# Patient Record
Sex: MALE | Race: BLACK OR AFRICAN AMERICAN | Employment: OTHER | URBAN - METROPOLITAN AREA
[De-identification: names, ages, dates, MRNs, and addresses within clinical notes are randomized per-mention and may not be internally consistent; named-entity substitution may affect disease eponyms.]

---

## 2017-07-16 ENCOUNTER — HOSPITAL ENCOUNTER (EMERGENCY)
Facility: HOSPITAL | Age: 82
Discharge: HOME | End: 2017-07-16
Attending: EMERGENCY MEDICINE
Payer: MEDICARE

## 2017-07-16 ENCOUNTER — APPOINTMENT (OUTPATIENT)
Dept: CT IMAGING | Facility: HOSPITAL | Age: 82
End: 2017-07-16
Attending: EMERGENCY MEDICINE
Payer: MEDICARE

## 2017-07-16 VITALS
DIASTOLIC BLOOD PRESSURE: 77 MMHG | RESPIRATION RATE: 15 BRPM | SYSTOLIC BLOOD PRESSURE: 161 MMHG | HEART RATE: 55 BPM | TEMPERATURE: 97 F | OXYGEN SATURATION: 99 %

## 2017-07-16 DIAGNOSIS — R40.4 TRANSIENT ALTERATION OF AWARENESS: Primary | ICD-10-CM

## 2017-07-16 LAB
ANION GAP SERPL CALC-SCNC: 8 MMOL/L (ref 0–18)
BASOPHILS # BLD: 0 K/UL (ref 0–0.2)
BASOPHILS NFR BLD: 1 %
BUN SERPL-MCNC: 20 MG/DL (ref 8–20)
BUN/CREAT SERPL: 10.3 (ref 10–20)
CALCIUM SERPL-MCNC: 9.4 MG/DL (ref 8.5–10.5)
CHLORIDE SERPL-SCNC: 111 MMOL/L (ref 95–110)
CO2 SERPL-SCNC: 23 MMOL/L (ref 22–32)
CREAT SERPL-MCNC: 1.94 MG/DL (ref 0.5–1.5)
EOSINOPHIL # BLD: 0.2 K/UL (ref 0–0.7)
EOSINOPHIL NFR BLD: 5 %
ERYTHROCYTE [DISTWIDTH] IN BLOOD BY AUTOMATED COUNT: 14.9 % (ref 11–15)
GLUCOSE BLDC GLUCOMTR-MCNC: 122 MG/DL (ref 70–99)
GLUCOSE SERPL-MCNC: 128 MG/DL (ref 70–99)
HCT VFR BLD AUTO: 42 % (ref 41–52)
HGB BLD-MCNC: 13.8 G/DL (ref 13.5–17.5)
LYMPHOCYTES # BLD: 0.7 K/UL (ref 1–4)
LYMPHOCYTES NFR BLD: 19 %
MCH RBC QN AUTO: 33.9 PG (ref 27–32)
MCHC RBC AUTO-ENTMCNC: 32.9 G/DL (ref 32–37)
MCV RBC AUTO: 102.9 FL (ref 80–100)
MONOCYTES # BLD: 0.3 K/UL (ref 0–1)
MONOCYTES NFR BLD: 9 %
NEUTROPHILS # BLD AUTO: 2.3 K/UL (ref 1.8–7.7)
NEUTROPHILS NFR BLD: 67 %
OSMOLALITY UR CALC.SUM OF ELEC: 298 MOSM/KG (ref 275–295)
PLATELET # BLD AUTO: 195 K/UL (ref 140–400)
PMV BLD AUTO: 9.5 FL (ref 7.4–10.3)
POTASSIUM SERPL-SCNC: 4.2 MMOL/L (ref 3.3–5.1)
RBC # BLD AUTO: 4.08 M/UL (ref 4.5–5.9)
SODIUM SERPL-SCNC: 142 MMOL/L (ref 136–144)
TROPONIN I SERPL-MCNC: 0.02 NG/ML (ref ?–0.03)
WBC # BLD AUTO: 3.4 K/UL (ref 4–11)

## 2017-07-16 PROCEDURE — 85025 COMPLETE CBC W/AUTO DIFF WBC: CPT | Performed by: EMERGENCY MEDICINE

## 2017-07-16 PROCEDURE — 36415 COLL VENOUS BLD VENIPUNCTURE: CPT

## 2017-07-16 PROCEDURE — 93005 ELECTROCARDIOGRAM TRACING: CPT

## 2017-07-16 PROCEDURE — 70450 CT HEAD/BRAIN W/O DYE: CPT | Performed by: EMERGENCY MEDICINE

## 2017-07-16 PROCEDURE — 93010 ELECTROCARDIOGRAM REPORT: CPT | Performed by: EMERGENCY MEDICINE

## 2017-07-16 PROCEDURE — 82962 GLUCOSE BLOOD TEST: CPT

## 2017-07-16 PROCEDURE — 85025 COMPLETE CBC W/AUTO DIFF WBC: CPT

## 2017-07-16 PROCEDURE — 80048 BASIC METABOLIC PNL TOTAL CA: CPT | Performed by: EMERGENCY MEDICINE

## 2017-07-16 PROCEDURE — 84484 ASSAY OF TROPONIN QUANT: CPT | Performed by: EMERGENCY MEDICINE

## 2017-07-16 PROCEDURE — 99285 EMERGENCY DEPT VISIT HI MDM: CPT

## 2017-07-16 PROCEDURE — 84484 ASSAY OF TROPONIN QUANT: CPT

## 2017-07-16 PROCEDURE — 80048 BASIC METABOLIC PNL TOTAL CA: CPT

## 2017-07-16 RX ORDER — MELATONIN 10 MG
10 CAPSULE ORAL NIGHTLY
COMMUNITY

## 2017-07-16 RX ORDER — DOCUSATE SODIUM 100 MG/1
100 CAPSULE, LIQUID FILLED ORAL 2 TIMES DAILY PRN
COMMUNITY

## 2017-07-16 RX ORDER — TAMSULOSIN HYDROCHLORIDE 0.4 MG/1
0.8 CAPSULE ORAL DAILY
COMMUNITY

## 2017-07-16 RX ORDER — ASPIRIN 325 MG
325 TABLET ORAL DAILY
Status: ON HOLD | COMMUNITY
End: 2018-07-23

## 2017-07-16 RX ORDER — CLOPIDOGREL BISULFATE 75 MG/1
75 TABLET ORAL DAILY
Status: ON HOLD | COMMUNITY
End: 2018-07-23

## 2017-07-16 RX ORDER — ACETAMINOPHEN 160 MG
2000 TABLET,DISINTEGRATING ORAL DAILY
COMMUNITY

## 2017-07-16 RX ORDER — ATORVASTATIN CALCIUM 10 MG/1
10 TABLET, FILM COATED ORAL NIGHTLY
COMMUNITY

## 2017-07-16 RX ORDER — NIFEDIPINE 90 MG/1
90 TABLET, EXTENDED RELEASE ORAL DAILY
COMMUNITY

## 2017-07-16 RX ORDER — MEMANTINE HYDROCHLORIDE 10 MG/1
10 TABLET ORAL 2 TIMES DAILY
Status: ON HOLD | COMMUNITY
End: 2018-07-21

## 2017-07-16 RX ORDER — LOSARTAN POTASSIUM 100 MG/1
100 TABLET ORAL DAILY
COMMUNITY

## 2017-07-16 RX ORDER — DONEPEZIL HYDROCHLORIDE 10 MG/1
10 TABLET, FILM COATED ORAL NIGHTLY
COMMUNITY

## 2017-07-16 NOTE — ED INITIAL ASSESSMENT (HPI)
Sister with pt and was eating breakfast. At approx 1100 She left the room and came back and was leaning forward, drooling and difficult to arouse.  Took about 15 min for him to return to his normal.

## 2017-07-16 NOTE — ED PROVIDER NOTES
Patient Seen in: [facility] AND [facility] Emergency Department    History   Patient presents with:  Altered Mental Status (neurologic)    Stated Complaint: ams    HPI    The patient is an 62-year-old male with a history of dementia and multiple TIAs who was sit complaint: ams  Other systems are as noted in HPI. Constitutional and vital signs reviewed. All other systems reviewed and negative except as noted above. PSFH elements reviewed from today and agreed except as otherwise stated in HPI.     Physical following:     POC Glucose  122 (*)     All other components within normal limits   CBC W/ DIFFERENTIAL - Abnormal; Notable for the following:     WBC 3.4 (*)     RBC 4.08 (*)     .9 (*)     MCH 33.9 (*)     Lymphocyte Absolute 0.7 (*)     All other Family comfortable with discharge plan and understands return if any symptoms recur    Symptoms consistent with either TIA or disorientation from dementia.   Patient already on aspirin and Plavix and PMD will complete workup as an outpatient    Disposition

## 2017-07-16 NOTE — ED NOTES
Discharge instructions given to sister. Sister verbalized understanding of home care and to follow up with pcp. Sister denied further questions or concerns. Pt discharged in stable condition with family.

## 2017-07-31 ENCOUNTER — APPOINTMENT (OUTPATIENT)
Dept: CT IMAGING | Facility: HOSPITAL | Age: 82
End: 2017-07-31
Attending: EMERGENCY MEDICINE
Payer: MEDICARE

## 2017-07-31 ENCOUNTER — HOSPITAL ENCOUNTER (EMERGENCY)
Facility: HOSPITAL | Age: 82
Discharge: HOME | End: 2017-07-31
Attending: EMERGENCY MEDICINE
Payer: MEDICARE

## 2017-07-31 VITALS
WEIGHT: 124 LBS | OXYGEN SATURATION: 100 % | HEART RATE: 63 BPM | SYSTOLIC BLOOD PRESSURE: 182 MMHG | RESPIRATION RATE: 20 BRPM | BODY MASS INDEX: 21.97 KG/M2 | DIASTOLIC BLOOD PRESSURE: 83 MMHG | TEMPERATURE: 98 F | HEIGHT: 63 IN

## 2017-07-31 DIAGNOSIS — S01.81XA FACIAL LACERATION, INITIAL ENCOUNTER: ICD-10-CM

## 2017-07-31 DIAGNOSIS — S00.83XA FACIAL HEMATOMA, INITIAL ENCOUNTER: ICD-10-CM
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
## 2017-07-31 PROCEDURE — 12011 RPR F/E/E/N/L/M 2.5 CM/<: CPT

## 2017-07-31 PROCEDURE — 99284 EMERGENCY DEPT VISIT MOD MDM: CPT

## 2017-07-31 PROCEDURE — 70450 CT HEAD/BRAIN W/O DYE: CPT | Performed by: EMERGENCY MEDICINE

## 2017-07-31 PROCEDURE — 90471 IMMUNIZATION ADMIN: CPT

## 2017-07-31 PROCEDURE — 72125 CT NECK SPINE W/O DYE: CPT | Performed by: EMERGENCY MEDICINE

## 2017-07-31 RX ORDER — ACETAMINOPHEN 500 MG
1000 TABLET ORAL ONCE
Status: COMPLETED | OUTPATIENT
Start: 2017-07-31 | End: 2017-07-31

## 2017-07-31 NOTE — ED NOTES
Pts sister given discharge instructions and follow instructions, questions answered and pts sister verbalized understanding.  Pt discharged via wc

## 2017-07-31 NOTE — ED INITIAL ASSESSMENT (HPI)
Pt family states he slipped in fell this morning right eye laceration no loc, pt is on blood thinners

## 2017-07-31 NOTE — ED PROVIDER NOTES
Patient Seen in: [facility] AND [facility] Emergency Department    History   Patient presents with:  Fall (musculoskeletal, neurologic)    Stated Complaint: fall    HPI    72-year-old male with past medical history of dementia, stroke on Plavix and aspirin prese Review of Systems   Unable to perform ROS: Dementia       Positive for stated complaint: fall  Other systems are as noted in HPI. Constitutional and vital signs reviewed. All other systems reviewed and negative except as noted above.     Paige Landry Abdominal: Soft. Normal appearance. There is no tenderness. There is no rigidity, no rebound, no guarding, no tenderness at McBurney's point and negative Lyon's sign.    Musculoskeletal:        Right shoulder: Normal.        Left shoulder: Normal. Fall was mechanical.  Patient's alert and oriented to baseline without any acute neurologic deficits. He is neurovascularly intact. Tetanus is updated. Dermabond is placed on patient's wound for closure.   Patient's head CT and cervical spine are also un PROCEDURE: CT BRAIN OR HEAD (CPT=70450)  COMPARISON: [facility], CT BRAIN OR HEAD (CPT=70450), 7/16/2017, 12:37. INDICATIONS: Fall with traumatic head injury and right supraorbital laceration.   TECHNIQUE: CT images were obtained without co are pneumatized. The patient appears largely edentulous with calculi minimal dental amalgam.  Dictated by (CST): Kwadwo Geiger MD on 7/31/2017 at 10:31     Approved by (CST): Kwadwo Geiger MD on 7/31/2017 at 10:34          CONCLUSION:  1.  Right periorb PROCEDURE: CT SPINE CERVICAL (CPT=72125)  COMPARISON: None available. INDICATIONS: Fall with traumatic head and neck injuries.   TECHNIQUE: Multidetector CT images of the cervical spine were obtained without the infusion of non-ionic intravenous contrast m Clinical impression as well as any lab results and radiology findings were discussed with the patient. Patient is advised to follow up with PCP for reevaluation. Return precautions were given.  Patient voices understanding and agreement with the treatment p

## 2017-07-31 NOTE — ED NOTES
Pt presents to the er with family member for evaluation s/p fall this am. Pt with a hx of a cva and dementia, family reports that pt is acting at baseline for him. Alert to name, slightly sleepy.  Family member reports that he is like that when he hasnt sle

## 2017-09-01 ENCOUNTER — HOSPITAL ENCOUNTER (EMERGENCY)
Facility: HOSPITAL | Age: 82
Discharge: HOME | End: 2017-09-01
Attending: EMERGENCY MEDICINE
Payer: MEDICARE

## 2017-09-01 VITALS
RESPIRATION RATE: 20 BRPM | HEART RATE: 94 BPM | BODY MASS INDEX: 18.37 KG/M2 | WEIGHT: 124 LBS | HEIGHT: 69 IN | DIASTOLIC BLOOD PRESSURE: 95 MMHG | OXYGEN SATURATION: 98 % | TEMPERATURE: 98 F | SYSTOLIC BLOOD PRESSURE: 161 MMHG

## 2017-09-01 DIAGNOSIS — R55 VASOVAGAL SYNCOPE: Primary | ICD-10-CM

## 2017-09-01 LAB
ANION GAP SERPL CALC-SCNC: 11 MMOL/L (ref 0–18)
BASOPHILS # BLD: 0 K/UL (ref 0–0.2)
BASOPHILS NFR BLD: 1 %
BILIRUB UR QL: NEGATIVE
BUN SERPL-MCNC: 24 MG/DL (ref 8–20)
BUN/CREAT SERPL: 11.3 (ref 10–20)
CALCIUM SERPL-MCNC: 9.5 MG/DL (ref 8.5–10.5)
CHLORIDE SERPL-SCNC: 106 MMOL/L (ref 95–110)
CHOLEST SERPL-MCNC: 168 MG/DL (ref 110–200)
CLARITY UR: CLEAR
CO2 SERPL-SCNC: 22 MMOL/L (ref 22–32)
COLOR UR: YELLOW
CREAT SERPL-MCNC: 2.12 MG/DL (ref 0.5–1.5)
EOSINOPHIL # BLD: 0.1 K/UL (ref 0–0.7)
EOSINOPHIL NFR BLD: 4 %
ERYTHROCYTE [DISTWIDTH] IN BLOOD BY AUTOMATED COUNT: 15 % (ref 11–15)
GLUCOSE BLDC GLUCOMTR-MCNC: 171 MG/DL (ref 70–99)
GLUCOSE SERPL-MCNC: 183 MG/DL (ref 70–99)
GLUCOSE UR-MCNC: NEGATIVE MG/DL
HCT VFR BLD AUTO: 38.7 % (ref 41–52)
HDLC SERPL-MCNC: 66 MG/DL
HGB BLD-MCNC: 13.2 G/DL (ref 13.5–17.5)
HGB UR QL STRIP.AUTO: NEGATIVE
HYALINE CASTS #/AREA URNS AUTO: 4 /LPF
KETONES UR-MCNC: NEGATIVE MG/DL
LDLC SERPL CALC-MCNC: 86 MG/DL (ref 0–99)
LYMPHOCYTES # BLD: 0.6 K/UL (ref 1–4)
LYMPHOCYTES NFR BLD: 17 %
MCH RBC QN AUTO: 34.4 PG (ref 27–32)
MCHC RBC AUTO-ENTMCNC: 34.1 G/DL (ref 32–37)
MCV RBC AUTO: 100.6 FL (ref 80–100)
MONOCYTES # BLD: 0.3 K/UL (ref 0–1)
MONOCYTES NFR BLD: 8 %
NEUTROPHILS # BLD AUTO: 2.7 K/UL (ref 1.8–7.7)
NEUTROPHILS NFR BLD: 72 %
NITRITE UR QL STRIP.AUTO: NEGATIVE
NONHDLC SERPL-MCNC: 102 MG/DL
OSMOLALITY UR CALC.SUM OF ELEC: 297 MOSM/KG (ref 275–295)
PH UR: 5 [PH] (ref 5–8)
PLATELET # BLD AUTO: 229 K/UL (ref 140–400)
PMV BLD AUTO: 9.3 FL (ref 7.4–10.3)
POTASSIUM SERPL-SCNC: 3.6 MMOL/L (ref 3.3–5.1)
PROT UR-MCNC: NEGATIVE MG/DL
RBC # BLD AUTO: 3.85 M/UL (ref 4.5–5.9)
RBC #/AREA URNS AUTO: 2 /HPF
SODIUM SERPL-SCNC: 139 MMOL/L (ref 136–144)
SP GR UR STRIP: 1.01 (ref 1–1.03)
TRIGL SERPL-MCNC: 78 MG/DL (ref 1–149)
TROPONIN I SERPL-MCNC: 0.06 NG/ML (ref ?–0.03)
TROPONIN I SERPL-MCNC: 0.07 NG/ML (ref ?–0.03)
UROBILINOGEN UR STRIP-ACNC: <2
VIT C UR-MCNC: NEGATIVE MG/DL
WBC # BLD AUTO: 3.8 K/UL (ref 4–11)
WBC #/AREA URNS AUTO: 4 /HPF

## 2017-09-01 PROCEDURE — 84484 ASSAY OF TROPONIN QUANT: CPT | Performed by: EMERGENCY MEDICINE

## 2017-09-01 PROCEDURE — 93005 ELECTROCARDIOGRAM TRACING: CPT

## 2017-09-01 PROCEDURE — 96361 HYDRATE IV INFUSION ADD-ON: CPT

## 2017-09-01 PROCEDURE — 81001 URINALYSIS AUTO W/SCOPE: CPT | Performed by: EMERGENCY MEDICINE

## 2017-09-01 PROCEDURE — 93010 ELECTROCARDIOGRAM REPORT: CPT | Performed by: EMERGENCY MEDICINE

## 2017-09-01 PROCEDURE — 85025 COMPLETE CBC W/AUTO DIFF WBC: CPT | Performed by: EMERGENCY MEDICINE

## 2017-09-01 PROCEDURE — 82962 GLUCOSE BLOOD TEST: CPT

## 2017-09-01 PROCEDURE — 99285 EMERGENCY DEPT VISIT HI MDM: CPT

## 2017-09-01 PROCEDURE — 80048 BASIC METABOLIC PNL TOTAL CA: CPT | Performed by: EMERGENCY MEDICINE

## 2017-09-01 PROCEDURE — 96360 HYDRATION IV INFUSION INIT: CPT

## 2017-09-01 PROCEDURE — 80061 LIPID PANEL: CPT | Performed by: EMERGENCY MEDICINE

## 2017-09-01 RX ORDER — SODIUM CHLORIDE 9 MG/ML
INJECTION, SOLUTION INTRAVENOUS ONCE
Status: COMPLETED | OUTPATIENT
Start: 2017-09-01 | End: 2017-09-01

## 2017-09-01 NOTE — ED INITIAL ASSESSMENT (HPI)
Rojas Irving arrives to ED via [facility] for eval of syncopal episode. His wife called 911 when he had syncopal episode on toilet. He has hx of dementia and CVA, which left him with right-side deficit, per wife. He c/o abd pain.

## 2017-09-02 NOTE — CM/SW NOTE
Spoke with wife about home health services. Wife agreed to home health services to help patient. Dr. Sherlyn Prince put in order for face to face. Will leave VM on Bridgets ext about patient.

## 2017-09-02 NOTE — ED PROVIDER NOTES
Patient Seen in: [facility] AND [facility] Emergency Department     History   Patient presents with:  Syncope (cardiovascular, neurologic)    Stated Complaint: syncope    HPI    [de-identified]year old male presents to the ER after an episode of syncope.   Wife reports patient complaint: syncope  Other systems are as noted in HPI. Constitutional and vital signs reviewed. All other systems reviewed and negative except as noted above. PSFH elements reviewed from today and agreed except as otherwise stated in HPI.     Physi BASIC METABOLIC PANEL (8) - Abnormal; Notable for the following:        Result Value    Glucose 183 (*)     BUN 24 (*)     Creatinine 2.12 (*)     Calculated Osmolality 297 (*)     GFR, Non- 30 (*)     GFR, -American 37 (*)     All display    ED Medications Administered:   Medications   0.9%  NaCl infusion (500 mL/hr Intravenous New Bag 9/1/17 9525)         Procedures: None    Re-Evaluation: 10:40 PM, patient remained perfectly stable and asymptomatic while in the ED without any arrh care.  Further Outpatient evaluation and treatment will be required.  I personally discussed the results of the above ED workup and a number of associated acute management issues with the guardian, and I explained the need for further follow-up evaluation a

## 2017-09-02 NOTE — HOME CARE LIAISON
DIAGNOSES AND PERTINENT MEDICAL HISTORY: SYNCOPE    FACILITY NAME AND DC DATE:[facility] SEEN IN ER WITH DC 9/1/17    BEDSIDE VISIT WITH: ALMA DELIA ( SPOKE PER TELEPHONE WITH SISTER Quentin Lazo)    SERVICES ORDERED: RN PT    VERIFIED PATIENT ADDRESS, PHONE NUMBER AND

## 2018-01-29 ENCOUNTER — HOSPITAL ENCOUNTER (EMERGENCY)
Facility: HOSPITAL | Age: 83
Discharge: HOME | End: 2018-01-30
Payer: MEDICARE

## 2018-01-29 ENCOUNTER — APPOINTMENT (OUTPATIENT)
Dept: GENERAL RADIOLOGY | Facility: HOSPITAL | Age: 83
End: 2018-01-29
Payer: MEDICARE

## 2018-01-29 DIAGNOSIS — M10.9 ACUTE GOUT INVOLVING TOE OF LEFT FOOT, UNSPECIFIED CAUSE: Primary | ICD-10-CM

## 2018-01-29 PROCEDURE — 99283 EMERGENCY DEPT VISIT LOW MDM: CPT

## 2018-01-29 PROCEDURE — 73630 X-RAY EXAM OF FOOT: CPT

## 2018-01-29 RX ORDER — METHYLPREDNISOLONE 4 MG/1
TABLET ORAL
Qty: 1 PACKAGE | Refills: 0 | Status: SHIPPED | OUTPATIENT
Start: 2018-01-29 | End: 2018-02-03

## 2018-01-29 RX ORDER — TRAMADOL HYDROCHLORIDE 50 MG/1
50 TABLET ORAL EVERY 6 HOURS PRN
Qty: 20 TABLET | Refills: 0 | Status: SHIPPED | OUTPATIENT
Start: 2018-01-29 | End: 2018-02-05

## 2018-01-30 VITALS
HEART RATE: 72 BPM | BODY MASS INDEX: 22.15 KG/M2 | RESPIRATION RATE: 20 BRPM | DIASTOLIC BLOOD PRESSURE: 81 MMHG | SYSTOLIC BLOOD PRESSURE: 144 MMHG | TEMPERATURE: 99 F | OXYGEN SATURATION: 98 % | WEIGHT: 125 LBS | HEIGHT: 63 IN

## 2018-01-30 NOTE — ED PROVIDER NOTES
Patient Seen in: [facility] AND [facility] Emergency Department    History   Patient presents with:  Swelling Edema (cardiovascular, metabolic)    Stated Complaint:     HPI    The patient is an 61-year-old male who presents with left foot pain since this morning Cardiovascular: Normal rate, regular rhythm, normal heart sounds and intact distal pulses. No murmur heard. Pulmonary/Chest: Effort normal and breath sounds normal.   Abdominal: Soft. Bowel sounds are normal. He exhibits no distension.  There is no tend Shantelle Deutsch [id number]  [phone]    Schedule an appointment as soon as possible for a visit      We recommend that you schedule follow up care with a primary care provider within the next three months to obtain basic health screening including reassessment

## 2018-01-30 NOTE — ED INITIAL ASSESSMENT (HPI)
Lt foot redness and swelling through day, wife states possible injury from dresser falling on foot.  + swelling, + cms, pt poor historian

## 2018-07-20 ENCOUNTER — APPOINTMENT (OUTPATIENT)
Dept: CT IMAGING | Facility: HOSPITAL | Age: 83
DRG: 086 | End: 2018-07-20
Payer: MEDICARE

## 2018-07-20 ENCOUNTER — HOSPITAL ENCOUNTER (INPATIENT)
Facility: HOSPITAL | Age: 83
LOS: 6 days | Discharge: NURSING, REHAB OR LONG-TERM CARE | DRG: 086 | End: 2018-07-26
Admitting: INTERNAL MEDICINE
Payer: MEDICARE

## 2018-07-20 ENCOUNTER — APPOINTMENT (OUTPATIENT)
Dept: CT IMAGING | Facility: HOSPITAL | Age: 83
DRG: 086 | End: 2018-07-20
Attending: EMERGENCY MEDICINE
Payer: MEDICARE

## 2018-07-20 DIAGNOSIS — I61.9 INTRAPARENCHYMAL HEMORRHAGE OF BRAIN (HCC): Primary | ICD-10-CM

## 2018-07-20 LAB
ANION GAP SERPL CALC-SCNC: 6 MMOL/L (ref 0–18)
ANTIBODY SCREEN: NEGATIVE
BASOPHILS # BLD: 0 K/UL (ref 0–0.2)
BASOPHILS NFR BLD: 0 %
BUN SERPL-MCNC: 17 MG/DL (ref 8–20)
BUN/CREAT SERPL: 8.5 (ref 10–20)
CALCIUM SERPL-MCNC: 9.7 MG/DL (ref 8.5–10.5)
CHLORIDE SERPL-SCNC: 109 MMOL/L (ref 95–110)
CO2 SERPL-SCNC: 29 MMOL/L (ref 22–32)
CREAT SERPL-MCNC: 2 MG/DL (ref 0.5–1.5)
EOSINOPHIL # BLD: 0.1 K/UL (ref 0–0.7)
EOSINOPHIL NFR BLD: 3 %
ERYTHROCYTE [DISTWIDTH] IN BLOOD BY AUTOMATED COUNT: 15.1 % (ref 11–15)
GLUCOSE SERPL-MCNC: 96 MG/DL (ref 70–99)
HCT VFR BLD AUTO: 36.5 % (ref 41–52)
HGB BLD-MCNC: 12.2 G/DL (ref 13.5–17.5)
LYMPHOCYTES # BLD: 0.4 K/UL (ref 1–4)
LYMPHOCYTES NFR BLD: 10 %
MCH RBC QN AUTO: 34.3 PG (ref 27–32)
MCHC RBC AUTO-ENTMCNC: 33.5 G/DL (ref 32–37)
MCV RBC AUTO: 102.3 FL (ref 80–100)
MONOCYTES # BLD: 0.3 K/UL (ref 0–1)
MONOCYTES NFR BLD: 8 %
NEUTROPHILS # BLD AUTO: 3 K/UL (ref 1.8–7.7)
NEUTROPHILS NFR BLD: 78 %
OSMOLALITY UR CALC.SUM OF ELEC: 299 MOSM/KG (ref 275–295)
PLATELET # BLD AUTO: 204 K/UL (ref 140–400)
PMV BLD AUTO: 8.5 FL (ref 7.4–10.3)
POTASSIUM SERPL-SCNC: 4.1 MMOL/L (ref 3.3–5.1)
RBC # BLD AUTO: 3.56 M/UL (ref 4.5–5.9)
RH BLOOD TYPE: POSITIVE
SODIUM SERPL-SCNC: 144 MMOL/L (ref 136–144)
WBC # BLD AUTO: 3.9 K/UL (ref 4–11)

## 2018-07-20 PROCEDURE — 86901 BLOOD TYPING SEROLOGIC RH(D): CPT | Performed by: EMERGENCY MEDICINE

## 2018-07-20 PROCEDURE — 96375 TX/PRO/DX INJ NEW DRUG ADDON: CPT

## 2018-07-20 PROCEDURE — 72125 CT NECK SPINE W/O DYE: CPT | Performed by: EMERGENCY MEDICINE

## 2018-07-20 PROCEDURE — 70450 CT HEAD/BRAIN W/O DYE: CPT

## 2018-07-20 PROCEDURE — 93005 ELECTROCARDIOGRAM TRACING: CPT

## 2018-07-20 PROCEDURE — 96361 HYDRATE IV INFUSION ADD-ON: CPT

## 2018-07-20 PROCEDURE — 93010 ELECTROCARDIOGRAM REPORT: CPT | Performed by: EMERGENCY MEDICINE

## 2018-07-20 PROCEDURE — 36430 TRANSFUSION BLD/BLD COMPNT: CPT

## 2018-07-20 PROCEDURE — 86900 BLOOD TYPING SEROLOGIC ABO: CPT | Performed by: EMERGENCY MEDICINE

## 2018-07-20 PROCEDURE — 86850 RBC ANTIBODY SCREEN: CPT | Performed by: EMERGENCY MEDICINE

## 2018-07-20 PROCEDURE — 85025 COMPLETE CBC W/AUTO DIFF WBC: CPT

## 2018-07-20 PROCEDURE — 96374 THER/PROPH/DIAG INJ IV PUSH: CPT

## 2018-07-20 PROCEDURE — 99285 EMERGENCY DEPT VISIT HI MDM: CPT

## 2018-07-20 PROCEDURE — 80048 BASIC METABOLIC PNL TOTAL CA: CPT

## 2018-07-20 RX ORDER — ATORVASTATIN CALCIUM 10 MG/1
10 TABLET, FILM COATED ORAL NIGHTLY
Status: DISCONTINUED | OUTPATIENT
Start: 2018-07-20 | End: 2018-07-26

## 2018-07-20 RX ORDER — ALFUZOSIN HYDROCHLORIDE 10 MG/1
10 TABLET, EXTENDED RELEASE ORAL
Status: DISCONTINUED | OUTPATIENT
Start: 2018-07-21 | End: 2018-07-26

## 2018-07-20 RX ORDER — DOCUSATE SODIUM 100 MG/1
100 CAPSULE, LIQUID FILLED ORAL 2 TIMES DAILY PRN
Status: DISCONTINUED | OUTPATIENT
Start: 2018-07-20 | End: 2018-07-26

## 2018-07-20 RX ORDER — DEXTROSE MONOHYDRATE AND SODIUM CHLORIDE 5; .225 G/100ML; G/100ML
INJECTION, SOLUTION INTRAVENOUS CONTINUOUS
Status: DISCONTINUED | OUTPATIENT
Start: 2018-07-20 | End: 2018-07-21

## 2018-07-20 RX ORDER — SODIUM CHLORIDE 9 MG/ML
INJECTION, SOLUTION INTRAVENOUS
Status: COMPLETED
Start: 2018-07-20 | End: 2018-07-20

## 2018-07-20 RX ORDER — HYDRALAZINE HYDROCHLORIDE 20 MG/ML
10 INJECTION INTRAMUSCULAR; INTRAVENOUS EVERY 4 HOURS PRN
Status: DISCONTINUED | OUTPATIENT
Start: 2018-07-20 | End: 2018-07-26

## 2018-07-20 RX ORDER — DEXTROSE AND SODIUM CHLORIDE 5; .45 G/100ML; G/100ML
INJECTION, SOLUTION INTRAVENOUS ONCE
Status: COMPLETED | OUTPATIENT
Start: 2018-07-20 | End: 2018-07-20

## 2018-07-20 RX ORDER — DONEPEZIL HYDROCHLORIDE 10 MG/1
10 TABLET, FILM COATED ORAL NIGHTLY
Status: DISCONTINUED | OUTPATIENT
Start: 2018-07-20 | End: 2018-07-26

## 2018-07-20 RX ORDER — HYDRALAZINE HYDROCHLORIDE 20 MG/ML
10 INJECTION INTRAMUSCULAR; INTRAVENOUS ONCE
Status: COMPLETED | OUTPATIENT
Start: 2018-07-20 | End: 2018-07-20

## 2018-07-20 RX ORDER — MEMANTINE HYDROCHLORIDE 10 MG/1
10 TABLET ORAL 2 TIMES DAILY
Status: DISCONTINUED | OUTPATIENT
Start: 2018-07-20 | End: 2018-07-26

## 2018-07-20 RX ORDER — LABETALOL HYDROCHLORIDE 5 MG/ML
10 INJECTION, SOLUTION INTRAVENOUS ONCE
Status: COMPLETED | OUTPATIENT
Start: 2018-07-20 | End: 2018-07-20

## 2018-07-20 RX ORDER — LOSARTAN POTASSIUM 100 MG/1
100 TABLET ORAL DAILY
Status: DISCONTINUED | OUTPATIENT
Start: 2018-07-20 | End: 2018-07-26

## 2018-07-20 NOTE — ED PROVIDER NOTES
Patient Seen in: [facility] AND [facility] Emergency Department    History   Patient presents with:  Head Neck Injury (neurologic, musculoskeletal)    Stated Complaint: fall earlier, hit head; on blood thinners     HPI    61-year-old male with history of dementi reactive to light. Neck: Normal range of motion. Neck supple. No posterior midline cervical spine pain on palpation. Cardiovascular: Normal rate, regular rhythm and intact and equal distal pulses.     Pulmonary/Chest: Effort normal. No respiratory distr panel order TYPE AND SCREEN.   Procedure                               Abnormality         Status                     ---------                               -----------         ------                     82 Vanessa Bhatti (BLOOD [facility])[[id number]] Prescribed:  Current Discharge Medication List        Present on Admission           ICD-10-CM Noted POA    Intraparenchymal hemorrhage of brain ([facility] I61.9 7/20/2018 Unknown

## 2018-07-20 NOTE — ED NOTES
Pt presents with sister, states pt fell off the bed today at 1500, hitting head on ceramic floor. Pt with hematoma to left forehead. Per family, pt is acting per norm, denies n/v.  + plavix.

## 2018-07-20 NOTE — ED INITIAL ASSESSMENT (HPI)
Pt fell off the side of his bed today and hit his head onto the ceramic aminah in home,hematoma to left forehead. Pt is on plavix.  Pt denies loc

## 2018-07-21 ENCOUNTER — APPOINTMENT (OUTPATIENT)
Dept: CT IMAGING | Facility: HOSPITAL | Age: 83
DRG: 086 | End: 2018-07-21
Attending: EMERGENCY MEDICINE
Payer: MEDICARE

## 2018-07-21 PROBLEM — E78.5 HYPERLIPIDEMIA: Status: ACTIVE | Noted: 2018-07-21
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
## 2018-07-21 PROBLEM — R26.9 NEUROLOGIC GAIT DISORDER: Status: ACTIVE | Noted: 2018-07-21

## 2018-07-21 PROBLEM — I10 ESSENTIAL HYPERTENSION, BENIGN: Status: ACTIVE | Noted: 2018-07-21

## 2018-07-21 PROBLEM — I12.9 HYPERTENSIVE KIDNEY DISEASE: Status: ACTIVE | Noted: 2018-07-21

## 2018-07-21 PROBLEM — F01.51: Status: ACTIVE | Noted: 2018-07-21
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
## 2018-07-21 LAB
ANION GAP SERPL CALC-SCNC: 6 MMOL/L (ref 0–18)
BASOPHILS # BLD: 0 K/UL (ref 0–0.2)
BASOPHILS NFR BLD: 0 %
BUN SERPL-MCNC: 14 MG/DL (ref 8–20)
BUN/CREAT SERPL: 8.2 (ref 10–20)
CALCIUM SERPL-MCNC: 9.2 MG/DL (ref 8.5–10.5)
CHLORIDE SERPL-SCNC: 111 MMOL/L (ref 95–110)
CHOLEST SERPL-MCNC: 163 MG/DL (ref 110–200)
CO2 SERPL-SCNC: 24 MMOL/L (ref 22–32)
CREAT SERPL-MCNC: 1.71 MG/DL (ref 0.5–1.5)
EOSINOPHIL # BLD: 0.1 K/UL (ref 0–0.7)
EOSINOPHIL NFR BLD: 3 %
ERYTHROCYTE [DISTWIDTH] IN BLOOD BY AUTOMATED COUNT: 15 % (ref 11–15)
GLUCOSE SERPL-MCNC: 104 MG/DL (ref 70–99)
HCT VFR BLD AUTO: 32.2 % (ref 41–52)
HDLC SERPL-MCNC: 62 MG/DL
HGB BLD-MCNC: 11.3 G/DL (ref 13.5–17.5)
LDLC SERPL CALC-MCNC: 92 MG/DL (ref 0–99)
LYMPHOCYTES # BLD: 0.6 K/UL (ref 1–4)
LYMPHOCYTES NFR BLD: 12 %
MAGNESIUM SERPL-MCNC: 1.8 MG/DL (ref 1.8–2.5)
MCH RBC QN AUTO: 34.9 PG (ref 27–32)
MCHC RBC AUTO-ENTMCNC: 35 G/DL (ref 32–37)
MCV RBC AUTO: 99.7 FL (ref 80–100)
MONOCYTES # BLD: 0.4 K/UL (ref 0–1)
MONOCYTES NFR BLD: 8 %
MRSA DNA SPEC QL NAA+PROBE: NEGATIVE
NEUTROPHILS # BLD AUTO: 3.9 K/UL (ref 1.8–7.7)
NEUTROPHILS NFR BLD: 77 %
NONHDLC SERPL-MCNC: 101 MG/DL
OSMOLALITY UR CALC.SUM OF ELEC: 293 MOSM/KG (ref 275–295)
PLATELET # BLD AUTO: 241 K/UL (ref 140–400)
PMV BLD AUTO: 8.7 FL (ref 7.4–10.3)
POTASSIUM SERPL-SCNC: 3 MMOL/L (ref 3.3–5.1)
POTASSIUM SERPL-SCNC: 4.6 MMOL/L (ref 3.3–5.1)
RBC # BLD AUTO: 3.23 M/UL (ref 4.5–5.9)
SODIUM SERPL-SCNC: 141 MMOL/L (ref 136–144)
TRIGL SERPL-MCNC: 47 MG/DL (ref 1–149)
TROPONIN I SERPL-MCNC: 0.01 NG/ML (ref ?–0.03)
WBC # BLD AUTO: 5.1 K/UL (ref 4–11)

## 2018-07-21 PROCEDURE — 70450 CT HEAD/BRAIN W/O DYE: CPT | Performed by: EMERGENCY MEDICINE

## 2018-07-21 PROCEDURE — 87641 MR-STAPH DNA AMP PROBE: CPT | Performed by: INTERNAL MEDICINE

## 2018-07-21 PROCEDURE — 83735 ASSAY OF MAGNESIUM: CPT | Performed by: INTERNAL MEDICINE

## 2018-07-21 PROCEDURE — 83036 HEMOGLOBIN GLYCOSYLATED A1C: CPT | Performed by: INTERNAL MEDICINE

## 2018-07-21 PROCEDURE — 84132 ASSAY OF SERUM POTASSIUM: CPT | Performed by: INTERNAL MEDICINE

## 2018-07-21 PROCEDURE — A4216 STERILE WATER/SALINE, 10 ML: HCPCS

## 2018-07-21 PROCEDURE — 80048 BASIC METABOLIC PNL TOTAL CA: CPT | Performed by: INTERNAL MEDICINE

## 2018-07-21 PROCEDURE — 85025 COMPLETE CBC W/AUTO DIFF WBC: CPT | Performed by: INTERNAL MEDICINE

## 2018-07-21 PROCEDURE — 80061 LIPID PANEL: CPT | Performed by: INTERNAL MEDICINE

## 2018-07-21 PROCEDURE — 84484 ASSAY OF TROPONIN QUANT: CPT | Performed by: INTERNAL MEDICINE

## 2018-07-21 RX ORDER — DOXEPIN HYDROCHLORIDE 50 MG/1
1 CAPSULE ORAL DAILY
Status: DISCONTINUED | OUTPATIENT
Start: 2018-07-21 | End: 2018-07-26

## 2018-07-21 RX ORDER — POTASSIUM CHLORIDE 14.9 MG/ML
20 INJECTION INTRAVENOUS ONCE
Status: COMPLETED | OUTPATIENT
Start: 2018-07-21 | End: 2018-07-21

## 2018-07-21 RX ORDER — 0.9 % SODIUM CHLORIDE 0.9 %
VIAL (ML) INJECTION
Status: COMPLETED
Start: 2018-07-21 | End: 2018-07-21

## 2018-07-21 RX ORDER — AMLODIPINE BESYLATE 5 MG/1
5 TABLET ORAL DAILY
COMMUNITY

## 2018-07-21 RX ORDER — GUAIFENESIN 100 MG/5ML
200 SOLUTION ORAL 4 TIMES DAILY PRN
Status: DISCONTINUED | OUTPATIENT
Start: 2018-07-21 | End: 2018-07-26

## 2018-07-21 RX ORDER — MEMANTINE HYDROCHLORIDE 10 MG/1
10 TABLET ORAL 2 TIMES DAILY
COMMUNITY

## 2018-07-21 RX ORDER — HALOPERIDOL 5 MG/ML
0.5 INJECTION INTRAMUSCULAR EVERY 4 HOURS PRN
Status: DISCONTINUED | OUTPATIENT
Start: 2018-07-21 | End: 2018-07-26

## 2018-07-21 NOTE — CONSULTS
[facility]    PATIENT'S NAME: ANGEL JOSUE   ATTENDING PHYSICIAN: Elvis Barry MD   CONSULTING PHYSICIAN: [facility]  Yoko Mendoza MD   PATIENT ACCOUNT#:   [id number]    LOCATION:  [address] [place] [address] #:   [id number]       CINDY ASSESSMENT AND PLAN:  Before he starts the Plavix and aspirin again, he should have another CT scan of the brain in 4 weeks to make sure that there is no residual hematoma, and if that is the case, then he may resume the Plavix and aspirin.   I also rec

## 2018-07-21 NOTE — CONSULTS
BP (!) 152/121 (BP Location: Right arm)   Pulse 78   Temp 98 °F (36.7 °C) (Temporal)   Resp 21   Ht 5' 5\" (1.651 m)   Wt 120 lb (54.4 kg)   SpO2 96%   BMI 19.97 kg/m²   Mr. Armen Stout is an 83 Y/O demented man that lives with his sister.   He is not ambulatory

## 2018-07-21 NOTE — DIETARY NOTE
ADULT NUTRITION INITIAL ASSESSMENT    Pt is at moderate nutrition risk. Pt does not meet malnutrition criteria. RECOMMENDATIONS TO MD:  See Nutrition Intervention for care plans.      NUTRITION DIAGNOSIS/PROBLEM:  Underweight related to inability to i Encounters:  07/20/18 : 54.4 kg (120 lb)  01/29/18 : 56.7 kg (125 lb)  09/01/17 : 56.2 kg (124 lb)  07/31/17 : 56.2 kg (124 lb)    Patient Weight(s) for the past 336 hrs:   Weight   07/20/18 1607 54.4 kg (120 lb)     GASTROINTESTINAL Status: Poor dentition

## 2018-07-21 NOTE — H&P
[facility]    PATIENT'S NAME: ANGEL JOSUE   ATTENDING PHYSICIAN: Elvis Prasad MD   PATIENT ACCOUNT#:   [id number]    LOCATION:  [address] [place] [address] #:   [id number]       YOB: 1935  ADMISSION DATE:       07/20/2018 1.   Intraparenchymal hemorrhage from fall at home. 2.   Multiinfarct dementia. 3.   History of old cerebrovascular accident. 4.   Hypertension. 5.   Hypertensive kidney disease. 6.   Hyperlipidemia.    7.   Chronic neurological gait disorder,

## 2018-07-21 NOTE — PLAN OF CARE
Integumentary status not within defined limits    • Pt's integumentary status will be adequate for discharge Progressing        NEUROLOGICAL - ADULT    • Achieves stable or improved neurological status Progressing    • Absence of seizures Progressing    •

## 2018-07-21 NOTE — PLAN OF CARE
NEUROLOGICAL - ADULT    • Achieves maximal functionality and self care Not Progressing        Patient/Family Goals    • Patient/Family Long Term Goal Not Progressing          Integumentary status not within defined limits    • Pt's integumentary status cesia

## 2018-07-21 NOTE — PROGRESS NOTES
[facility]    Progress Note    Gabi Cates Patient Status:  Inpatient    1935 MRN [id number]   Location [facility] 2W/SW Attending Cecil Walsh MD   Hosp Day # 1 PCP SAURABH VALLADARES MD       SUBJECTIVE:  Patient:.   R incidental findings as above. Patient/sister: OK. Slept fair. RN: Stable. OBJECTIVE:  Vital signs in last 24 hours:  BP (!) 129/95 (BP Location: Right arm)   Pulse 90   Temp 98 °F (36.7 °C) (Temporal)   Resp 22   Ht 5' 5\" (1.651 m)   Wt 120 lb (54.

## 2018-07-22 LAB
BLOOD TYPE BARCODE: 5100
HBA1C MFR BLD: 4.6 % (ref 4–6)

## 2018-07-22 PROCEDURE — 92610 EVALUATE SWALLOWING FUNCTION: CPT

## 2018-07-22 PROCEDURE — 97161 PT EVAL LOW COMPLEX 20 MIN: CPT

## 2018-07-22 PROCEDURE — 97163 PT EVAL HIGH COMPLEX 45 MIN: CPT

## 2018-07-22 PROCEDURE — 97116 GAIT TRAINING THERAPY: CPT

## 2018-07-22 NOTE — PLAN OF CARE
Problem: Patient Centered Care  Goal: Patient preferences are identified and integrated in the patient's plan of care  Interventions:  - What would you like us to know as we care for you?  Per sister, patient have dementia, sister takes care of patient   - to increase activity and self care with guidance from PT/OT  - Encourage visually impaired, hearing impaired and aphasic patients to use assistive/communication devices   Outcome: Progressing  Stable hemorrhage per md,  Neuro unchanged,  Pt aggitated and a

## 2018-07-22 NOTE — SLP NOTE
ADULT SWALLOWING EVALUATION    ASSESSMENT    ASSESSMENT/OVERALL IMPRESSION:  Orders rec'd, chart reviewed. Pt with known hx of CVA with residual speech deficits and progressive cognitive impairment prior to this admission.  Pt's sister present and provided precautions  Discharge Recommendations/Plan: Undetermined    HISTORY   MEDICAL HISTORY  Reason for Referral: R/O aspiration    Problem List  Principal Problem:    Intraparenchymal hemorrhage of brain [facility])  Active Problems:    Essential hypertension, benig complaints consistent with possible esophageal involvement    GOALS  Goal #1 The patient will tolerate general consistency and thin liquids without overt signs or symptoms of aspiration with 100 % accuracy over 2 session(s).   In Progress   Goal #2 The brien

## 2018-07-22 NOTE — OCCUPATIONAL THERAPY NOTE
OCCUPATIONAL THERAPY EVALUATION - INPATIENT     Room Number: 325/325-A  Evaluation Date: 7/22/2018  Type of Evaluation: Initial  Presenting Problem:  (fell OOB with small ICH)    Physician Order: IP Consult to Occupational Therapy  Reason for Therapy: AD and presents with no skilled Occupational Therapy needs  at this time. Patient will be discharged from Occupational Therapy services. Please re-order if a new functional limitation presents during this admission.     OCCUPATIONAL THERAPY MEDICAL/SOCIAL HIS washing, rinsing, drying)?: A Lot  -   Toileting, which includes using toilet, bedpan or urinal? : A Lot  -   Putting on and taking off regular upper body clothing?: A Lot  -   Taking care of personal grooming such as brushing teeth?: A Lot  -   Eating bailee

## 2018-07-22 NOTE — PROGRESS NOTES
[facility]    Progress Note    Samuel Nelson Patient Status:  Inpatient    1935 MRN [id number]   Location [facility] 3W/SW Attending Tanisha Barrera MD   Hosp Day # 2 PCP SAURABH VALLADARES MD       SUBJECTIVE:  No CP, SOB, Oral BID   Alfuzosin HCl ER (UROXATRAL) 24 hr tab 10 mg 10 mg Oral Daily with breakfast         Assessment  Patient Active Problem List:     Intraparenchymal hemorrhage of brain (HCC)     Essential hypertension, benign     Dementia, multiinfarct, with beha

## 2018-07-22 NOTE — PLAN OF CARE
Pt became aggressive and threatening during shower,  Now shaking fist and swinging at staff, security here to assist in getting back in bed. Haldol given. Pt now quieter in bed.  Bed alarms on

## 2018-07-22 NOTE — PHYSICAL THERAPY NOTE
PHYSICAL THERAPY EVALUATION - INPATIENT     Room Number: 325/325-A  Evaluation Date: 7/22/2018  Type of Evaluation: initial  Physician Order: PT Eval and Treat    Presenting Problem: admitted s/p falling off the bed sustaining right parietal intracranial Recommendations: 24 hour care/supervision;Sub-acute rehabilitation    PLAN  PT Treatment Plan: Bed mobility; Coordination;Gait training;Neuromuscular re-educate;Strengthening;Transfer training;Balance training     Frequency (Obs):  (3-5/wk)       PHYSICAL T bedside commode, etc.): A Little   -   Moving from lying on back to sitting on the side of the bed?: A Little   How much help from another person does the patient currently need. ..   -   Moving to and from a bed to a chair (including a wheelchair)?: Total

## 2018-07-23 LAB
ALBUMIN SERPL BCP-MCNC: 3.1 G/DL (ref 3.5–4.8)
ALBUMIN/GLOB SERPL: 1.3 {RATIO} (ref 1–2)
ALP SERPL-CCNC: 61 U/L (ref 32–100)
ALT SERPL-CCNC: 22 U/L (ref 17–63)
ANION GAP SERPL CALC-SCNC: 8 MMOL/L (ref 0–18)
ANION GAP SERPL CALC-SCNC: 8 MMOL/L (ref 0–18)
AST SERPL-CCNC: 26 U/L (ref 15–41)
BILIRUB SERPL-MCNC: 0.3 MG/DL (ref 0.3–1.2)
BUN SERPL-MCNC: 24 MG/DL (ref 8–20)
BUN SERPL-MCNC: 24 MG/DL (ref 8–20)
BUN/CREAT SERPL: 11.3 (ref 10–20)
BUN/CREAT SERPL: 11.3 (ref 10–20)
CALCIUM SERPL-MCNC: 9 MG/DL (ref 8.5–10.5)
CALCIUM SERPL-MCNC: 9 MG/DL (ref 8.5–10.5)
CHLORIDE SERPL-SCNC: 112 MMOL/L (ref 95–110)
CHLORIDE SERPL-SCNC: 112 MMOL/L (ref 95–110)
CO2 SERPL-SCNC: 22 MMOL/L (ref 22–32)
CO2 SERPL-SCNC: 22 MMOL/L (ref 22–32)
CREAT SERPL-MCNC: 2.13 MG/DL (ref 0.5–1.5)
CREAT SERPL-MCNC: 2.13 MG/DL (ref 0.5–1.5)
GLOBULIN PLAS-MCNC: 2.4 G/DL (ref 2.5–3.7)
GLUCOSE SERPL-MCNC: 92 MG/DL (ref 70–99)
GLUCOSE SERPL-MCNC: 92 MG/DL (ref 70–99)
OSMOLALITY UR CALC.SUM OF ELEC: 298 MOSM/KG (ref 275–295)
OSMOLALITY UR CALC.SUM OF ELEC: 298 MOSM/KG (ref 275–295)
POTASSIUM SERPL-SCNC: 4.2 MMOL/L (ref 3.3–5.1)
POTASSIUM SERPL-SCNC: 4.2 MMOL/L (ref 3.3–5.1)
PROT SERPL-MCNC: 5.5 G/DL (ref 5.9–8.4)
SODIUM SERPL-SCNC: 142 MMOL/L (ref 136–144)
SODIUM SERPL-SCNC: 142 MMOL/L (ref 136–144)

## 2018-07-23 PROCEDURE — 80053 COMPREHEN METABOLIC PANEL: CPT | Performed by: INTERNAL MEDICINE

## 2018-07-23 PROCEDURE — 97116 GAIT TRAINING THERAPY: CPT

## 2018-07-23 PROCEDURE — 92526 ORAL FUNCTION THERAPY: CPT

## 2018-07-23 PROCEDURE — 97530 THERAPEUTIC ACTIVITIES: CPT

## 2018-07-23 RX ORDER — DOXEPIN HYDROCHLORIDE 50 MG/1
1 CAPSULE ORAL DAILY
Qty: 30 TABLET | Refills: 0 | Status: SHIPPED | OUTPATIENT
Start: 2018-07-24 | End: 2018-08-23

## 2018-07-23 NOTE — PHYSICAL THERAPY NOTE
PHYSICAL THERAPY TREATMENT NOTE - INPATIENT     Room Number: 325/325-A       Presenting Problem: admitted s/p falling off the bed sustaining right parietal intracranial hemorrhage  (neurosurgery on consult; conservative tx.)    Problem List  Principal Prob (including adjusting bedclothes, sheets and blankets)?: A Little   -   Sitting down on and standing up from a chair with arms (e.g., wheelchair, bedside commode, etc.): A Little   -   Moving from lying on back to sitting on the side of the bed?: A Little supervision   Goal #4   Current Status  NT   Goal #5 Patient to demonstrate independence with home activity/exercise instructions provided to patient in preparation for discharge.

## 2018-07-23 NOTE — SLP NOTE
SPEECH DAILY NOTE - INPATIENT    ASSESSMENT & PLAN   ASSESSMENT  Pt seen for swallowing therapy to monitor swallowing tolerance and train swallowing precautions per BSE recommendations.  Pt seen for swallowing therapy while sitting upright in the bed self f No overt clinical signs of aspiration observed with any consistency: no reflexive cough, no throat clearing, and vocal quality remains clear. No new CXR.   In Progress   Goal #2 The patient/family/caregiver will demonstrate understanding and implementation

## 2018-07-23 NOTE — PLAN OF CARE
Integumentary status not within defined limits    • Pt's integumentary status will be adequate for discharge Progressing        NEUROLOGICAL - ADULT    • Achieves stable or improved neurological status Progressing    • Achieves maximal functionality and se

## 2018-07-23 NOTE — CM/SW NOTE
ADDENDUM 2:44PM    Sw received and MDO for discharge planning. SW met with pt and his sister Florence at bedside. Pt lives with his sister Irina Babcock in 2 story house with 4 stairs. Pt's sister is his primary caregiver.  Pt receives caregiver services 5 days a w

## 2018-07-23 NOTE — PAYOR COMM NOTE
--------------  ADMISSION REVIEW     [facility]  Subscriber #:  [id number]  Authorization Number: [id number]    Admit date: 7/20/18  Admit time: 2004       Admitting Physician: River Justice MD  Attending Physician:  River Justice, Aixa Conway and negative except as noted above.     Physical Exam   ED Triage Vitals [07/20/18 1607]  BP: (!) 143/106  Pulse: 70  Resp: 18  Temp: 97.7 °F (36.5 °C)  Temp src: Oral  SpO2: 97 %  O2 Device: None (Room air)    Current:BP (!) 164/79   Pulse 65   Temp 97.7 ° (*)     RDW 15.1 (*)     Lymphocyte Absolute 0.4 (*)     All other components within normal limits   CBC WITH DIFFERENTIAL WITH PLATELET    Narrative: The following orders were created for panel order CBC WITH DIFFERENTIAL WITH PLATELET.   Procedure of critical care time in obtaining history, performing a physical exam, bedside monitoring of interventions, collecting and interpreting tests and discussion with consultants but not including time spent performing procedures.   Disposition and Plan     Cli management by Neurosurgery. PAST MEDICAL HISTORY:  Hypertension; hypertensive kidney disease; CVA with multiinfarct dementia; chronic gait disorder, requiring wheelchair, and he still falls at home; hyperlipidemia.      MEDICATIONS:  Several.  They are hr tab 10 mg     Date Action Dose Route User    7/23/2018 0801 Given 10 mg Oral Price Posada, RN      atorvastatin (LIPITOR) tab 10 mg     Date Action Dose Route User    7/22/2018 2058 Given 10 mg Oral Gracia Patterson RN      Donepezil HCl (ARICEPT) 07/21/2018   HCT 32.2 07/21/2018    07/21/2018   CREATSERUM 1.71 07/21/2018   BUN 14 07/21/2018    07/21/2018   K 3.0 07/21/2018    07/21/2018   CO2 24 07/21/2018    07/21/2018   CA 9.2 07/21/2018            Imaging:     CT HEAD C present  Ext.: No calf tenderness/ankle edema. Neuro: No focal deficeit  Skin:  Per RN.   Psych: Chr. forgetful        Assessment  Patient Active Problem List:     Intraparenchymal hemorrhage of brain ([facility]     Essential hypertension, benign     Dementia, to move all 4 extremities. He opened his mouth and stuck his tongue out on command.   He had contraction of the legs but he is able to move them equally, strong.       I personally reviewed the CT scan of the brain from yesterday and today showing the smal effort  CV: Heart with regular rate and rhythm, no peripheral edema  Abd: Abdomen soft, nontender, nondistended, no organomegaly, bowel sounds present  MSK: Full range of motion in extremities, no clubbing, no cyanosis  Skin: no rashes or lesions     Data GLU 92 07/23/2018   GLU 92 07/23/2018   CA 9.0 07/23/2018   CA 9.0 07/23/2018   ALB 3.1 07/23/2018   ALKPHO 61 07/23/2018   BILT 0.3 07/23/2018   TP 5.5 07/23/2018   AST 26 07/23/2018   ALT 22 07/23/2018            Imaging:  Patient: OK, no headache.   RN treatment team.     Discharge plan:  To SNF ASA family decides.

## 2018-07-23 NOTE — PROGRESS NOTES
[facility]    Progress Note    Dorenda Divine Patient Status:  Inpatient    1935 MRN [id number]   Location [facility] 3W/SW Attending Zeynep Walden MD   Hosp Day # 3 PCP SAURABH VALLADARES MD       SUBJECTIVE:  Patient:.   R Intake/Output:    Intake/Output Summary (Last 24 hours) at 07/23/18 0854  Last data filed at 07/23/18 0834   Gross per 24 hour   Intake              393 ml   Output                0 ml   Net              393 ml       Wt Readings from Last 3 Encounters:

## 2018-07-24 PROCEDURE — A4216 STERILE WATER/SALINE, 10 ML: HCPCS

## 2018-07-24 RX ORDER — 0.9 % SODIUM CHLORIDE 0.9 %
VIAL (ML) INJECTION
Status: COMPLETED
Start: 2018-07-24 | End: 2018-07-24

## 2018-07-24 NOTE — CM/SW NOTE
CM/  Progression of Care      This CM contacted [facility]  to determine discharge status and progression of Care.   Carlo Lund informed this CM  Authorization at [facility] denied as  Patient assessed to be more appropriate for

## 2018-07-24 NOTE — CM/SW NOTE
ADDENDUM 11:35AM    RN notified SW that pt's sister had some questions re the d/c plan. GRETCHEN met with pt's sister at bedside and discussed the status of rin referral. GRETCHEN explained that the  pt's insurance has denied tcoverage.  GRETCHEN explained that the next step

## 2018-07-24 NOTE — PROGRESS NOTES
[place] [facility] - [facility]    Progress Note    Adilia Mukherjee Patient Status:  Inpatient    1935 MRN [id number]   Location [facility] 3W/SW Attending Mandi Quiroga MD   Hosp Day # 4 PCP SAURABH VALLADARES MD       SUBJECTIVE:  Patient:.   R Negative. Endocrine: negative. Hematology: negative. Skin: negative. Psych.: negative.     Exam  Gen: No acute distress, alert  HEENT: No jaundice, no anemia. Neck: negative. Pulm: Lungs clear, no wheezing/rhonchi/crpitation.   CV: Heart with regular

## 2018-07-25 PROCEDURE — 97110 THERAPEUTIC EXERCISES: CPT

## 2018-07-25 PROCEDURE — 97530 THERAPEUTIC ACTIVITIES: CPT

## 2018-07-25 NOTE — CM/SW NOTE
ADDENDUM 4:31PM    GRETCHEN spoke with Florence ro's sister and informed her that [employer] denied the peer to peer. SW provided list of facilities who might have medicaid beds. [place] stated that she has toured [facility] and liked it.  GRETCHEN left message with Ta phelan

## 2018-07-25 NOTE — PROGRESS NOTES
[facility]    Progress Note    Galileo [place] Patient Status:  Inpatient    1935 MRN [id number]   Location [facility] 3W/SW Attending Deepika Lane MD   Hosp Day # 5 PCP SAURABH VALLADARES MD       SUBJECTIVE:  Patient:. Did negative.     Exam  Gen: No acute distress, alert  HEENT: No jaundice, no anemia. Neck: negative. Pulm: Lungs clear, no wheezing/rhonchi/crpitation.   CV: Heart with regular rate and rhythm, no peripheral edema  Abd: Abdomen soft, nontender, nondistended,

## 2018-07-25 NOTE — PLAN OF CARE
Problem: Patient Centered Care  Goal: Patient preferences are identified and integrated in the patient's plan of care  Interventions:  - What would you like us to know as we care for you?  Per sister: \"Bay has dementia and I take care of him at home\"  - Progressing    Goal: Achieves maximal functionality and self care  INTERVENTIONS  - Monitor swallowing and airway patency with patient fatigue and changes in neurological status  - Encourage and assist patient to increase activity and self care with jasbir

## 2018-07-25 NOTE — CM/SW NOTE
Referral sent  Via [employer] by Garth Linares , to [facility] , located at  [address] [place] [address], [facility],DXRBE number is [phone], pending authorization appeal for ABBI from [facility].

## 2018-07-25 NOTE — CM/SW NOTE
Received a letter of denial from [facility] regarding recommendations  For  [address] [place] [address] ,  Phone number to appeal given to Dr. Hadley Cevallos to call Chandler Garrett [phone] extension [phone]. Dr. Clark Rodriguez for Appeal and Voice Mail message left for Chandler Garrett.

## 2018-07-25 NOTE — PHYSICAL THERAPY NOTE
PHYSICAL THERAPY TREATMENT NOTE - INPATIENT     Room Number: 325/325-A       Presenting Problem: admitted s/p falling off the bed sustaining right parietal intracranial hemorrhage  (neurosurgery on consult; conservative tx.)    Problem List  Principal Prob patient    OBJECTIVE  Precautions: Bed/chair alarm    WEIGHT BEARING RESTRICTION  Weight Bearing Restriction: None                PAIN ASSESSMENT              BALANCE Goal #1   Current Status NT this session;  Mod A previous   Goal #2 Patient is able to demonstrate transfers sit <-> stand w/ cga   Goal #2  Current Status  Mod A   Goal #3 Patient is able to ambulate 30 feet with or without ad w/ cga x2   Goal #3   Katarzyna

## 2018-07-26 VITALS
OXYGEN SATURATION: 95 % | BODY MASS INDEX: 19.18 KG/M2 | DIASTOLIC BLOOD PRESSURE: 87 MMHG | WEIGHT: 115.13 LBS | SYSTOLIC BLOOD PRESSURE: 110 MMHG | HEART RATE: 86 BPM | HEIGHT: 65 IN | RESPIRATION RATE: 14 BRPM | TEMPERATURE: 98 F

## 2018-07-26 LAB
ANION GAP SERPL CALC-SCNC: 6 MMOL/L (ref 0–18)
BUN SERPL-MCNC: 40 MG/DL (ref 8–20)
BUN/CREAT SERPL: 16.7 (ref 10–20)
CALCIUM SERPL-MCNC: 9.2 MG/DL (ref 8.5–10.5)
CHLORIDE SERPL-SCNC: 113 MMOL/L (ref 95–110)
CO2 SERPL-SCNC: 26 MMOL/L (ref 22–32)
CREAT SERPL-MCNC: 2.39 MG/DL (ref 0.5–1.5)
GLUCOSE SERPL-MCNC: 97 MG/DL (ref 70–99)
OSMOLALITY UR CALC.SUM OF ELEC: 310 MOSM/KG (ref 275–295)
POTASSIUM SERPL-SCNC: 4 MMOL/L (ref 3.3–5.1)
SODIUM SERPL-SCNC: 145 MMOL/L (ref 136–144)

## 2018-07-26 PROCEDURE — 80048 BASIC METABOLIC PNL TOTAL CA: CPT | Performed by: INTERNAL MEDICINE

## 2018-07-26 NOTE — CM/SW NOTE
ADDENDUM 1:48PM    Javier from [address] contacted GRETCHEN and asked for the [facility] denial letter to be faxed to him at [phone]. GRETCHEN faxed letter. / to remain available for support and/or discharge planning.      Magali Lindo manager to remain available for support and/or discharge planning.      Marylou Fajardo  [id number]

## 2018-07-26 NOTE — PAYOR COMM NOTE
--------------  CONTINUED STAY REVIEW    [employer] Alfaro MEDICARE ADV PPO  Subscriber #:  [id number]  Authorization Number: [id number]    Admit date: 7/20/18  Admit time: 2004    Admitting Physician: Ramón Shelley MD  Attending Physician:  Ramón Daniels., Encounters:  07/25/18 : 109 lb (49.4 kg)  01/29/18 : 125 lb (56.7 kg)  09/01/17 : 124 lb (56.2 kg)     ROS:(O/T in CC)  General: Negative. HEENT: Negative. Cardiac: negative. Pulmonary: negative. GI: negative. : negative. Neuro: negative.   Franche[place]a (52.2 kg)  01/29/18 : 125 lb (56.7 kg)  09/01/17 : 124 lb (56.2 kg)        ROS:(O/T in CC)  General: Negative. HEENT: Negative. Cardiac: negative. Pulmonary: negative. GI: negative. : negative. Neuro: negative. Skelleto-muscular: Negative.   Endocr

## 2018-07-26 NOTE — PROGRESS NOTES
[place] [facility] - [facility]    Progress Note    Renee Haroon Patient Status:  Inpatient    1935 MRN [id number]   Location [facility] 3W/SW Attending Stephen Alvarado MD   Hosp Day # 6 PCP SAURABH VALLADARES MD         No new sx.     Past Hx: negative. Psych.: negative.     Exam  Gen: No acute distress, resting in bed  HEENT: No jaundice, no anemia. Neck: negative. Pulm: Lungs clear, no wheezing/rhonchi/crpitation.   CV: Heart with regular rate and rhythm, no peripheral edema  Abd: Abdomen so

## 2018-07-26 NOTE — CM/SW NOTE
GRETCHEN spoke with [facility] [phone]  Who stated that [facility] does have a medicaid memory care unit bed. Chante Sanders and memory care director will evaluate the pt this morning to determine if he is appropriate.  GRETCHEN spoke with pt's sister Bebeto Naranjo

## 2018-07-28 NOTE — DISCHARGE SUMMARY
[facility]    PATIENT'S NAME: ANGEL JOSUE   ATTENDING PHYSICIAN: Elvis Regan MD   PATIENT ACCOUNT#:   [id number]    LOCATION:  [address] [place] [address] #:   [id number]       YOB: 1935  ADMISSION DATE:       07/20/2018

## 2018-08-13 ENCOUNTER — APPOINTMENT (OUTPATIENT)
Dept: GENERAL RADIOLOGY | Facility: HOSPITAL | Age: 83
DRG: 682 | End: 2018-08-13
Attending: EMERGENCY MEDICINE
Payer: MEDICARE

## 2018-08-13 ENCOUNTER — APPOINTMENT (OUTPATIENT)
Dept: CT IMAGING | Facility: HOSPITAL | Age: 83
DRG: 682 | End: 2018-08-13
Attending: EMERGENCY MEDICINE
Payer: MEDICARE

## 2018-08-13 ENCOUNTER — HOSPITAL ENCOUNTER (INPATIENT)
Facility: HOSPITAL | Age: 83
LOS: 4 days | Discharge: HOSPICE | DRG: 682 | End: 2018-08-18
Attending: EMERGENCY MEDICINE | Admitting: INTERNAL MEDICINE
Payer: MEDICARE
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
## 2018-08-13 DIAGNOSIS — J18.9 NOSOCOMIAL PNEUMONIA: Primary | ICD-10-CM

## 2018-08-13 DIAGNOSIS — N17.9 ACUTE RENAL FAILURE SUPERIMPOSED ON CHRONIC KIDNEY DISEASE, UNSPECIFIED CKD STAGE, UNSPECIFIED ACUTE RENAL FAILURE TYPE (HCC): ICD-10-CM

## 2018-08-13 DIAGNOSIS — E86.0 DEHYDRATION: ICD-10-CM

## 2018-08-13 DIAGNOSIS — N18.9 ACUTE RENAL FAILURE SUPERIMPOSED ON CHRONIC KIDNEY DISEASE, UNSPECIFIED CKD STAGE, UNSPECIFIED ACUTE RENAL FAILURE TYPE (HCC): ICD-10-CM

## 2018-08-13 LAB
ANION GAP SERPL CALC-SCNC: 12 MMOL/L (ref 0–18)
BASOPHILS # BLD: 0 K/UL (ref 0–0.2)
BASOPHILS NFR BLD: 0 %
BILIRUB UR QL: NEGATIVE
BUN SERPL-MCNC: 69 MG/DL (ref 8–20)
BUN/CREAT SERPL: 20.5 (ref 10–20)
CALCIUM SERPL-MCNC: 9.8 MG/DL (ref 8.5–10.5)
CHLORIDE SERPL-SCNC: 125 MMOL/L (ref 95–110)
CO2 SERPL-SCNC: 17 MMOL/L (ref 22–32)
COLOR UR: YELLOW
CREAT SERPL-MCNC: 3.36 MG/DL (ref 0.5–1.5)
EOSINOPHIL # BLD: 0 K/UL (ref 0–0.7)
EOSINOPHIL NFR BLD: 0 %
ERYTHROCYTE [DISTWIDTH] IN BLOOD BY AUTOMATED COUNT: 15.5 % (ref 11–15)
GLUCOSE SERPL-MCNC: 106 MG/DL (ref 70–99)
GLUCOSE UR-MCNC: NEGATIVE MG/DL
HCT VFR BLD AUTO: 40.3 % (ref 41–52)
HGB BLD-MCNC: 13.2 G/DL (ref 13.5–17.5)
KETONES UR-MCNC: NEGATIVE MG/DL
LACTATE SERPL-SCNC: 2 MMOL/L (ref 0.5–2.2)
LYMPHOCYTES # BLD: 0.5 K/UL (ref 1–4)
LYMPHOCYTES NFR BLD: 5 %
MCH RBC QN AUTO: 34 PG (ref 27–32)
MCHC RBC AUTO-ENTMCNC: 32.8 G/DL (ref 32–37)
MCV RBC AUTO: 103.4 FL (ref 80–100)
MONOCYTES # BLD: 0.5 K/UL (ref 0–1)
MONOCYTES NFR BLD: 4 %
NEUTROPHILS # BLD AUTO: 10.8 K/UL (ref 1.8–7.7)
NEUTROPHILS NFR BLD: 91 %
NITRITE UR QL STRIP.AUTO: NEGATIVE
OSMOLALITY UR CALC.SUM OF ELEC: 339 MOSM/KG (ref 275–295)
PH UR: 5 [PH] (ref 5–8)
PLATELET # BLD AUTO: 253 K/UL (ref 140–400)
PMV BLD AUTO: 9.7 FL (ref 7.4–10.3)
POTASSIUM SERPL-SCNC: 4.6 MMOL/L (ref 3.3–5.1)
PROT UR-MCNC: NEGATIVE MG/DL
RBC # BLD AUTO: 3.9 M/UL (ref 4.5–5.9)
RBC #/AREA URNS AUTO: 0 /HPF
SODIUM SERPL-SCNC: 154 MMOL/L (ref 136–144)
SP GR UR STRIP: 1.01 (ref 1–1.03)
UROBILINOGEN UR STRIP-ACNC: <2
VIT C UR-MCNC: NEGATIVE MG/DL
WBC # BLD AUTO: 11.9 K/UL (ref 4–11)
WBC #/AREA URNS AUTO: 9 /HPF

## 2018-08-13 PROCEDURE — 71045 X-RAY EXAM CHEST 1 VIEW: CPT | Performed by: EMERGENCY MEDICINE

## 2018-08-13 PROCEDURE — 70450 CT HEAD/BRAIN W/O DYE: CPT | Performed by: EMERGENCY MEDICINE

## 2018-08-14 ENCOUNTER — APPOINTMENT (OUTPATIENT)
Dept: GENERAL RADIOLOGY | Facility: HOSPITAL | Age: 83
DRG: 682 | End: 2018-08-14
Attending: INTERNAL MEDICINE
Payer: MEDICARE

## 2018-08-14 ENCOUNTER — APPOINTMENT (OUTPATIENT)
Dept: ULTRASOUND IMAGING | Facility: HOSPITAL | Age: 83
DRG: 682 | End: 2018-08-14
Attending: INTERNAL MEDICINE
Payer: MEDICARE

## 2018-08-14 PROBLEM — N18.9 ACUTE RENAL FAILURE SUPERIMPOSED ON CHRONIC KIDNEY DISEASE, UNSPECIFIED CKD STAGE, UNSPECIFIED ACUTE RENAL FAILURE TYPE (HCC): Status: ACTIVE | Noted: 2018-08-14

## 2018-08-14 PROBLEM — N17.9 ACUTE RENAL FAILURE SUPERIMPOSED ON CHRONIC KIDNEY DISEASE, UNSPECIFIED CKD STAGE, UNSPECIFIED ACUTE RENAL FAILURE TYPE (HCC): Status: ACTIVE | Noted: 2018-08-14

## 2018-08-14 PROBLEM — E86.0 DEHYDRATION: Status: ACTIVE | Noted: 2018-08-14

## 2018-08-14 LAB
ANION GAP SERPL CALC-SCNC: 7 MMOL/L (ref 0–18)
BUN SERPL-MCNC: 62 MG/DL (ref 8–20)
BUN/CREAT SERPL: 19.3 (ref 10–20)
CALCIUM SERPL-MCNC: 9.4 MG/DL (ref 8.5–10.5)
CHLORIDE SERPL-SCNC: 129 MMOL/L (ref 95–110)
CO2 SERPL-SCNC: 20 MMOL/L (ref 22–32)
CREAT SERPL-MCNC: 3.21 MG/DL (ref 0.5–1.5)
GLUCOSE SERPL-MCNC: 110 MG/DL (ref 70–99)
MRSA DNA SPEC QL NAA+PROBE: NEGATIVE
OSMOLALITY UR CALC.SUM OF ELEC: 340 MOSM/KG (ref 275–295)
POTASSIUM SERPL-SCNC: 3.9 MMOL/L (ref 3.3–5.1)
SODIUM SERPL-SCNC: 156 MMOL/L (ref 136–144)
URATE SERPL-MCNC: 10.7 MG/DL (ref 3.3–8.7)

## 2018-08-14 PROCEDURE — 72170 X-RAY EXAM OF PELVIS: CPT | Performed by: INTERNAL MEDICINE

## 2018-08-14 PROCEDURE — 74230 X-RAY XM SWLNG FUNCJ C+: CPT | Performed by: INTERNAL MEDICINE

## 2018-08-14 PROCEDURE — 73552 X-RAY EXAM OF FEMUR 2/>: CPT | Performed by: INTERNAL MEDICINE

## 2018-08-14 PROCEDURE — 76775 US EXAM ABDO BACK WALL LIM: CPT | Performed by: INTERNAL MEDICINE

## 2018-08-14 RX ORDER — AMLODIPINE BESYLATE 5 MG/1
5 TABLET ORAL DAILY
Status: DISCONTINUED | OUTPATIENT
Start: 2018-08-14 | End: 2018-08-18

## 2018-08-14 RX ORDER — DOXEPIN HYDROCHLORIDE 50 MG/1
1 CAPSULE ORAL DAILY
Status: DISCONTINUED | OUTPATIENT
Start: 2018-08-14 | End: 2018-08-18

## 2018-08-14 RX ORDER — DOCUSATE SODIUM 100 MG/1
100 CAPSULE, LIQUID FILLED ORAL 2 TIMES DAILY PRN
Status: DISCONTINUED | OUTPATIENT
Start: 2018-08-14 | End: 2018-08-18

## 2018-08-14 RX ORDER — DONEPEZIL HYDROCHLORIDE 10 MG/1
10 TABLET, FILM COATED ORAL NIGHTLY
Status: DISCONTINUED | OUTPATIENT
Start: 2018-08-14 | End: 2018-08-18

## 2018-08-14 RX ORDER — 0.9 % SODIUM CHLORIDE 0.9 %
VIAL (ML) INJECTION
Status: COMPLETED
Start: 2018-08-14 | End: 2018-08-14

## 2018-08-14 RX ORDER — MEMANTINE HYDROCHLORIDE 10 MG/1
10 TABLET ORAL 2 TIMES DAILY
Status: DISCONTINUED | OUTPATIENT
Start: 2018-08-14 | End: 2018-08-18

## 2018-08-14 RX ORDER — HYDRALAZINE HYDROCHLORIDE 20 MG/ML
10 INJECTION INTRAMUSCULAR; INTRAVENOUS EVERY 4 HOURS PRN
Status: DISCONTINUED | OUTPATIENT
Start: 2018-08-14 | End: 2018-08-18

## 2018-08-14 RX ORDER — ATORVASTATIN CALCIUM 10 MG/1
10 TABLET, FILM COATED ORAL NIGHTLY
Status: DISCONTINUED | OUTPATIENT
Start: 2018-08-14 | End: 2018-08-18

## 2018-08-14 RX ORDER — LOSARTAN POTASSIUM 100 MG/1
100 TABLET ORAL DAILY
Status: DISCONTINUED | OUTPATIENT
Start: 2018-08-14 | End: 2018-08-14

## 2018-08-14 RX ORDER — MEGESTROL ACETATE 40 MG/ML
200 SUSPENSION ORAL 2 TIMES DAILY
COMMUNITY
Start: 2018-08-02 | End: 2018-09-02

## 2018-08-14 RX ORDER — ALFUZOSIN HYDROCHLORIDE 10 MG/1
10 TABLET, EXTENDED RELEASE ORAL
Status: DISCONTINUED | OUTPATIENT
Start: 2018-08-14 | End: 2018-08-18

## 2018-08-14 RX ORDER — DEXTROSE MONOHYDRATE AND SODIUM CHLORIDE 5; .225 G/100ML; G/100ML
INJECTION, SOLUTION INTRAVENOUS CONTINUOUS
Status: DISCONTINUED | OUTPATIENT
Start: 2018-08-14 | End: 2018-08-15

## 2018-08-14 RX ORDER — MEGESTROL ACETATE 40 MG/ML
200 SUSPENSION ORAL 2 TIMES DAILY
Status: DISCONTINUED | OUTPATIENT
Start: 2018-08-14 | End: 2018-08-18

## 2018-08-14 NOTE — PROGRESS NOTES
H & P dictated.     Problem List Items Addressed This Visit        Respiratory    Nosocomial pneumonia - Primary    Relevant Medications    Piperacillin Sod-Tazobactam So (ZOSYN) 3.375 g in dextrose 5 % 100 mL ADD-vantage (Completed)    Piperacillin Sod-Ta

## 2018-08-14 NOTE — PLAN OF CARE
Problem: Patient/Family Goals  Goal: Patient/Family Long Term Goal  Patient's Long Term Goal: per sister \" want to figure out what is causing the pneumonia\"    Interventions:  - Video swallow   - maintain NPO  - See additional Care Plan goals for specifi period  INTERVENTIONS  - Monitor WBC  - Administer growth factors as ordered  - Implement neutropenic guidelines  Outcome: Progressing      Problem: SAFETY ADULT - FALL  Goal: Free from fall injury  INTERVENTIONS:  - Assess pt frequently for physical needs saturation or ABGs  - Provide Smoking Cessation handout, if applicable  - Encourage broncho-pulmonary hygiene including cough, deep breathe, Incentive Spirometry  - Assess the need for suctioning and perform as needed  - Assess and instruct to report SOB o

## 2018-08-14 NOTE — CONSULTS
[facility]    Report of Consultation    Venkatesh Stanford Patient Status:  Inpatient    1935 MRN [id number]   Location [facility] 5SW/SE Attending Prince Avery MD   Hosp Day # 0 PCP SAURABH Spann MD     Date of Admission: with breakfast   Cholecalciferol (VITAMIN D) 1000 units tab 2,000 Units 2,000 Units Oral Daily   dextrose 5 % and 0.2 % NaCl infusion  Intravenous Continuous       Prescriptions Prior to Admission:  Megestrol Acetate 40 MG/ML Oral Suspension Take 200 mg by WBC 11.9 (H) 08/13/2018   HGB 13.2 (L) 08/13/2018   HCT 40.3 (L) 08/13/2018    08/13/2018   CREATSERUM 3.21 (H) 08/14/2018   BUN 62 (H) 08/14/2018    (H) 08/14/2018   K 3.9 08/14/2018    (H) 08/14/2018   CO2 20 (L) 08/14/2018    hypertension  On Amlodipine & Nifedipine     Dementia, multiinfarct, with behavioral disturbance  S/p CVA     Nosocomial pneumonia   Being treated by PCP. R/O Aspiration.  BSE  Ordered by PCP       Thank you for allowing me to participate in the care of you

## 2018-08-14 NOTE — SLP NOTE
ADULT VIDEOFLUOROSCOPIC SWALLOWING STUDY    Admission Date: 8/13/2018  Evaluation Date: 08/14/18  Radiologist:  Dr. Vance Rubinstein:    Pt to remain NPO with BSE to be attempted on 8/16/18.  Collaborated with RN regarding Pt's swallowing plan of care exam.). Patient Viewed: Lateral.   .  Consistencies Presented: Thin liquids;Puree to assess oropharyngeal swallow function and assess for compensatory strategies to improve safe swallow function.     THIN LIQUIDS  Method of Presentation: Straw  Oral Phase verbalized.     Patient Experiencing Pain: No    FOLLOW UP  Treatment Plan/Recommendations: SLP to reassess (BSE to be completed. )  Number of Visits to Meet Established Goals: 2    Thank you for your referral.   If you have any questions, please contact

## 2018-08-14 NOTE — CM/SW NOTE
Pt is from [facility]. GERTCHEN asked PARUL/Adriane to send clinical updates to Liza/Melody.      Shannon Camp, [address]

## 2018-08-14 NOTE — ED PROVIDER NOTES
Patient Seen in: [facility] AND [facility] Emergency Department    History   Patient presents with:  Altered Mental Status (neurologic)    Stated Complaint: ams    HPI     82-year-old male with history of hypertension, hyperlipidemia, prior CVA, dementia, and co round no pallor or injection  ENT, Mouth: mucous membranes moist  Respiratory: there are no retractions, lungs are clear to auscultation  Cardiovascular: Tachycardic, regular rhythm  Gastrointestinal:  abdomen is soft and non tender, no masses, bowel sound results for these tests on the individual orders.    RAINBOW DRAW BLUE   RAINBOW DRAW LAVENDER   RAINBOW DRAW DARK GREEN   RAINBOW DRAW LIGHT GREEN   RAINBOW DRAW GOLD   RAINBOW DRAW LAVENDER TALL (BNP)   URINE CULTURE, ROUTINE   BLOOD CULTURE   BLOOD CULTU

## 2018-08-14 NOTE — ED INITIAL ASSESSMENT (HPI)
Pt not eating or drinking per Family Hx. States that he is less alert than previous day. PMH of CVA's.  No C/O fever, N/V.

## 2018-08-14 NOTE — H&P
[facility]    PATIENT'S NAME: ANGEL JOSUE   ATTENDING PHYSICIAN: Elvis Montano MD   PATIENT ACCOUNT#:   [id number]    LOCATION:  [address] [place] [address] RECORD #:   [id number]       YOB: 1935  ADMISSION DATE:       08/13/2018 or respiratory, though the patient did not take a deep inspiration. HEART:  Mild tachycardia. ABDOMEN:  Negative. EXTREMITIES:  No ankle edema. MUSCULOSKELETAL:  Moves 4 extremities, frail looking, but confined. IMPRESSION:    1.   Dehydration.

## 2018-08-14 NOTE — PROGRESS NOTES
[facility] Pharmacy Note:  Renal Adjustment for piperacillin/tazobactam ([place])    Maia Tripp is a 82year old male who has been prescribed piperacillin/tazobactam (ZOSYN) 3.375 gm every 8 hrs.   CrCl is estimated creatinine clearance is 12.7 mL/min (A) (based

## 2018-08-15 ENCOUNTER — APPOINTMENT (OUTPATIENT)
Dept: GENERAL RADIOLOGY | Facility: HOSPITAL | Age: 83
DRG: 682 | End: 2018-08-15
Attending: INTERNAL MEDICINE
Payer: MEDICARE

## 2018-08-15 LAB
ALBUMIN SERPL BCP-MCNC: 3.2 G/DL (ref 3.5–4.8)
ANION GAP SERPL CALC-SCNC: 11 MMOL/L (ref 0–18)
BASOPHILS # BLD: 0 K/UL (ref 0–0.2)
BASOPHILS NFR BLD: 0 %
BUN SERPL-MCNC: 53 MG/DL (ref 8–20)
BUN/CREAT SERPL: 18.2 (ref 10–20)
CALCIUM SERPL-MCNC: 9 MG/DL (ref 8.5–10.5)
CHLORIDE SERPL-SCNC: 131 MMOL/L (ref 95–110)
CO2 SERPL-SCNC: 13 MMOL/L (ref 22–32)
CREAT SERPL-MCNC: 2.92 MG/DL (ref 0.5–1.5)
EOSINOPHIL # BLD: 0 K/UL (ref 0–0.7)
EOSINOPHIL NFR BLD: 0 %
ERYTHROCYTE [DISTWIDTH] IN BLOOD BY AUTOMATED COUNT: 15.2 % (ref 11–15)
GLUCOSE SERPL-MCNC: 130 MG/DL (ref 70–99)
HCT VFR BLD AUTO: 40.3 % (ref 41–52)
HGB BLD-MCNC: 13.3 G/DL (ref 13.5–17.5)
LYMPHOCYTES # BLD: 0.7 K/UL (ref 1–4)
LYMPHOCYTES NFR BLD: 8 %
MCH RBC QN AUTO: 34.2 PG (ref 27–32)
MCHC RBC AUTO-ENTMCNC: 33 G/DL (ref 32–37)
MCV RBC AUTO: 103.7 FL (ref 80–100)
MONOCYTES # BLD: 0.4 K/UL (ref 0–1)
MONOCYTES NFR BLD: 5 %
NEUTROPHILS # BLD AUTO: 7.9 K/UL (ref 1.8–7.7)
NEUTROPHILS NFR BLD: 87 %
OSMOLALITY UR CALC.SUM OF ELEC: 336 MOSM/KG (ref 275–295)
PHOSPHATE SERPL-MCNC: 2.9 MG/DL (ref 2.4–4.7)
PLATELET # BLD AUTO: 205 K/UL (ref 140–400)
PMV BLD AUTO: 9.8 FL (ref 7.4–10.3)
POTASSIUM SERPL-SCNC: 3.8 MMOL/L (ref 3.3–5.1)
RBC # BLD AUTO: 3.88 M/UL (ref 4.5–5.9)
SODIUM SERPL-SCNC: 155 MMOL/L (ref 136–144)
WBC # BLD AUTO: 9.1 K/UL (ref 4–11)

## 2018-08-15 PROCEDURE — 71045 X-RAY EXAM CHEST 1 VIEW: CPT | Performed by: INTERNAL MEDICINE

## 2018-08-15 RX ORDER — POTASSIUM CHLORIDE 14.9 MG/ML
20 INJECTION INTRAVENOUS ONCE
Status: DISCONTINUED | OUTPATIENT
Start: 2018-08-15 | End: 2018-08-15

## 2018-08-15 RX ORDER — DEXTROSE MONOHYDRATE 50 MG/ML
INJECTION, SOLUTION INTRAVENOUS CONTINUOUS
Status: DISCONTINUED | OUTPATIENT
Start: 2018-08-15 | End: 2018-08-18

## 2018-08-15 RX ORDER — 0.9 % SODIUM CHLORIDE 0.9 %
VIAL (ML) INJECTION
Status: COMPLETED
Start: 2018-08-15 | End: 2018-08-15

## 2018-08-15 NOTE — CDS QUERY
Pneumonia Specificity  CLINICAL DOCUMENTATION CLARIFICATION FORM    Dear Doctor:  Clinical information (provided below) indicates pneumonia.  For accurate ICD-10-CM code assignment to reflect severity of illness and risk of mortality, please further clarify

## 2018-08-15 NOTE — PROGRESS NOTES
[facility]    Progress Note    Osmin Sanchez Patient Status:  Inpatient    1935 MRN [id number]   Location [facility] 5SW/SE Attending Nisha Mueller MD   Hosp Day # 1 PCP SAURABH VALLADARES MD       Subjective:   Yessy Safe ALKPHO 61 07/23/2018   BILT 0.3 07/23/2018   TP 5.5 (L) 07/23/2018   AST 26 07/23/2018   ALT 22 07/23/2018   MG 1.8 07/21/2018   PHOS 2.9 08/15/2018   TROP 0.01 07/21/2018       Xr Femur Min 2 Views Right (cpt=73552)    Result Date: 8/14/2018  CONCLUSION Maryann Kang MD on 8/14/2018 at 13:58          Xr Chest Ap Portable  (cpt=71045)    Result Date: 8/15/2018  CONCLUSION: Normal examination.       Dictated by (CST): Maryann Thacker MD on 8/15/2018 at 9:05     Approved by (CST): Farzaneh Thacker

## 2018-08-15 NOTE — SLP NOTE
ADULT SWALLOWING EVALUATION    ASSESSMENT    ASSESSMENT/OVERALL IMPRESSION:  Pt seen for a bedside swallowing evaluation secondary to reduced alertness and worsening CXR. Pt was admitted with diagnosis of dehydration.   The known to the speech department f bolus awareness, delayed oropharyngeal transits, and increased combative/agitation of pt. Pt presents with moderate-severe oral dysphagia and probable pharyngeal dysfunction.   Recommend to continue NPO and speech to re-assess swallowing as alertness imp MOTOR EXAMINATION  Dentition: Natural (Scattered Dentition)     Strength: Unable to assess  Tone: Unable to assess  Range of Motion: Unable to assess  Rate of Motion: Unable to assess    Voice Quality:  (Non Verbal)  Respiratory Status: Unlabored  Consiste

## 2018-08-15 NOTE — PLAN OF CARE
Problem: Patient/Family Goals  Goal: Patient/Family Long Term Goal  Patient's Long Term Goal: per sister \" want to figure out what is causing the pneumonia\"    Interventions:  - Video swallow   - maintain NPO  - See additional Care Plan goals for specifi Outcome: Progressing      Problem: RESPIRATORY - ADULT  Goal: Achieves optimal ventilation and oxygenation  INTERVENTIONS:  - Assess for changes in respiratory status  - Assess for changes in mentation and behavior  - Position to facilitate oxygenation a

## 2018-08-15 NOTE — PROGRESS NOTES
[facility]    Progress Note    Sara Mcgowan Patient Status:  Inpatient    1935 MRN [id number]   Location [facility] 5SW/SE Attending Pancho Aguilar MD   Hosp Day # 1 PCP SAURABH VALLADARES MD       SUBJECTIVE:  Patient:. Wt 109 lb 9.6 oz (49.7 kg)   SpO2 94%   BMI 18.24 kg/m²     Intake/Output:    Intake/Output Summary (Last 24 hours) at 08/15/18 1412  Last data filed at 08/15/18 0615   Gross per 24 hour   Intake             1179 ml   Output                0 ml   Net

## 2018-08-15 NOTE — PAYOR COMM NOTE
--------------  ADMISSION REVIEW     [employer] MEDICARE ADV PPO  Subscriber #:  [id number]  Authorization Number: [id number]    Admit date: 8/14/18  Admit time: [address]       Admitting Physician: Marck Jackson MD  Attending Physician:  Nafisa Estrada Review of Systems    Positive for stated complaint: ams  Other systems are as noted in HPI. Constitutional and vital signs reviewed. All other systems reviewed and negative except as noted above.     Physical Exam   ED Triage Vitals  BP: (!) 150 W/ DIFFERENTIAL - Abnormal; Notable for the following:     WBC 11.9 (*)     RBC 3.90 (*)     HGB 13.2 (*)     HCT 40.3 (*)     .4 (*)     MCH 34.0 (*)     RDW 15.5 (*)     Neutrophil Absolute 10.8 (*)     Lymphocyte Absolute 0.5 (*)     All other co failure superimposed on chronic kidney disease, unspecified CKD stage, unspecified acute renal failure type [facility])    Disposition:  Admit  8/13/2018 10:36 pm    Follow-up:  No follow-up provider specified.   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 ALLERGIES:  None.     FAMILY HISTORY:  Recently in a skil Route User    8/15/2018 0132 New Bag (none) Intravenous Jaret Gallegos RN    8/14/2018 0912 New Bag (none) Intravenous Orvel Gaucher, RN      Piperacillin Sod-Tazobactam So (ZOSYN) 3.375 g in dextrose 5 % 100 mL ADD-vantage     Date Action Dose Route User IV fluids . Currie catheter ordered to document I & O but RN unable to do so   Advised to check bladder scan ,if RV > 200 ml , call PCP for Urology consult     Hypernatremia   Due to lack of free water. On appropriate IV fluids .  If gets worse , may need to

## 2018-08-16 PROBLEM — Z71.89 ADVANCE CARE PLANNING: Status: ACTIVE | Noted: 2018-08-16

## 2018-08-16 PROBLEM — Z71.89 GOALS OF CARE, COUNSELING/DISCUSSION: Status: ACTIVE | Noted: 2018-08-16

## 2018-08-16 PROBLEM — R13.10 DYSPHAGIA: Status: ACTIVE | Noted: 2018-08-16

## 2018-08-16 LAB
ALBUMIN SERPL BCP-MCNC: 2.9 G/DL (ref 3.5–4.8)
ANION GAP SERPL CALC-SCNC: 8 MMOL/L (ref 0–18)
BUN SERPL-MCNC: 42 MG/DL (ref 8–20)
BUN/CREAT SERPL: 16.7 (ref 10–20)
CALCIUM SERPL-MCNC: 8.9 MG/DL (ref 8.5–10.5)
CHLORIDE SERPL-SCNC: 128 MMOL/L (ref 95–110)
CO2 SERPL-SCNC: 18 MMOL/L (ref 22–32)
CREAT SERPL-MCNC: 2.52 MG/DL (ref 0.5–1.5)
GLUCOSE SERPL-MCNC: 119 MG/DL (ref 70–99)
OSMOLALITY UR CALC.SUM OF ELEC: 330 MOSM/KG (ref 275–295)
PHOSPHATE SERPL-MCNC: 2.6 MG/DL (ref 2.4–4.7)
POTASSIUM SERPL-SCNC: 3.3 MMOL/L (ref 3.3–5.1)
SODIUM SERPL-SCNC: 154 MMOL/L (ref 136–144)

## 2018-08-16 PROCEDURE — 99223 1ST HOSP IP/OBS HIGH 75: CPT | Performed by: NURSE PRACTITIONER

## 2018-08-16 NOTE — PROGRESS NOTES
[facility]    Progress Note    Kika Pierre Patient Status:  Inpatient    1935 MRN [id number]   Location [facility] 5SW/SE Attending Alaina Blackman MD   Hosp Day # 2 PCP SAURABH VALLADARES MD       Subjective:   Chantal Pickard examination with obscuration of the proximal right femur. Within these parameters: 1. No radiographically visible acute osseous injury of the visualized right femur. 2. Heavy atherosclerosis with evidence of vascular stenting.     Dictated by (CST): Corey Brenner

## 2018-08-16 NOTE — PLAN OF CARE
Problem: Patient/Family Goals  Goal: Patient/Family Long Term Goal  Patient's Long Term Goal: per sister \" want to figure out what is causing the pneumonia\"    Interventions:  - Video swallow   - maintain NPO  - See additional Care Plan goals for specifi neutropenic period  INTERVENTIONS  - Monitor WBC  - Administer growth factors as ordered  - Implement neutropenic guidelines   Outcome: Progressing      Problem: SAFETY ADULT - FALL  Goal: Free from fall injury  INTERVENTIONS:  - Assess pt frequently for p and minimize respiratory effort  - Oxygen supplementation based on oxygen saturation or ABGs  - Provide Smoking Cessation handout, if applicable  - Encourage broncho-pulmonary hygiene including cough, deep breathe, Incentive Spirometry  - Assess the need f

## 2018-08-16 NOTE — PROGRESS NOTES
[place] [facility] HOS[facility] - [facility]    Progress Note    Marcus Lang Patient Status:  Inpatient    1935 MRN [id number]   Location [facility] 5SW/SE Attending Addison Fisher MD   Hosp Day # 2 PCP SAURABH VALLADARES MD       SUBJECTIVE:  Patient:. (49.7 kg)   SpO2 99%   BMI 18.24 kg/m²     Intake/Output:  No intake or output data in the 24 hours ending 08/16/18 0720    Wt Readings from Last 3 Encounters:  08/14/18 : 109 lb 9.6 oz (49.7 kg)  07/26/18 : 115 lb 1.6 oz (52.2 kg)  01/29/18 : 125 lb (56.7

## 2018-08-16 NOTE — CONSULTS
[place] Patient Status:  Inpatient    1935 MRN [id number]   Location [facility] 5SW/SE Attending Marck Jackson MD   Hosp Day # 2 PCP SAURABH Zhu MD     Date of Cons hemorrhage after a fall. Patient was also seen in the ED twice since September 2017. Patient was seen and examined in bed with sister Florence at the bedside. Florence's son Hannah Ma came into the room shortly after I arrived. Shannon Rodriguez is awake and alert.  He tr function. We discussed that he is being treated for a possible pneumonia (although subsequent CXR is now clear). I provided education about the typical disease trajectory of dementia over time with associated symptoms and decline.  We talked about his fa yes  Ethnicity: Black or African American     Allergies:  No Known Allergies    Medications:     Current Facility-Administered Medications:   •  dextrose 5% infusion, , Intravenous, Continuous  •  atorvastatin (LIPITOR) tab 10 mg, 10 mg, Oral, Nightly  • obscuration of the proximal right femur. Within these parameters: 1. No radiographically visible acute osseous injury of the visualized right femur. 2. Heavy atherosclerosis with evidence of vascular stenting.     Dictated by (CST): Shonna Blas MD on 8 health care surrogate decision-maker  Code status: Full Code; no limits set at this time  Have advanced directives been discussed with patient or healthcare surrogate decision-maker: Yes  Advance Directive: None              Pre-existing DNR/DNI Order: No care.    Discussed today's visit with RN Lucy Foster and Irene Vidales. Page out to Dr. Rebeka Regan. Thank you for allowing Palliative Care Services team to participate in the care of your patient. Will continue to follow clinically.  Plan to follow-up with feliciano

## 2018-08-16 NOTE — SLP NOTE
ADULT SWALLOWING RE-EVALUATION    ASSESSMENT    ASSESSMENT/OVERALL IMPRESSION:      Pt assessed sitting upright in bed. Pt was fed pureed and nectar-thick liquids via tsp. Pt with reduced mouth opening and reduced cooperation to accept oral trials.  Pt with aspiration    Problem List  Principal Problem:    Dehydration  Active Problems:    Essential hypertension, benign    Dementia, multiinfarct, with behavioral disturbance    Hypertensive kidney disease    Neurologic gait disorder    Hyperlipidemia    Nosocom complaints consistent with possible esophageal involvement      FCM Score: 2   FCM Impression: Abnormal     GOALS  Goal #1 Family education to be completed regarding recommendation for NPO status.         FOLLOW UP  Treatment Plan/Recommendations:  (family

## 2018-08-16 NOTE — CDS QUERY
Potential for Impaired Nutritional Status  DOCUMENTATION CLARIFICATION FORM  Dear Doctor:  Clinical information in the RD Assessment using ASPEN Criteria suggests potential for impaired nutritional status.  For accurate ICD-10-CM code assignment to reflect

## 2018-08-16 NOTE — PROGRESS NOTES
08/16/18 1725   Clinical Encounter Type   Visited With Patient   Routine Visit Introduction   Continue Visiting Yes   Referral From Nurse  (Palliative care)   Referral To    Patient Spiritual Encounters   Spiritual Assessment Completed Yes   Spi

## 2018-08-16 NOTE — DIETARY NOTE
ADULT NUTRITION INITIAL ASSESSMENT    Pt is at high nutrition risk. Pt meets malnutrition criteria. RECOMMENDATIONS TO MD:  Ethic committee or Palliative Care consult to determine nutrition care plans.       CRITERIA FOR MALNUTRITION DIAGNOSIS:  Crite PERTINENT PAST MEDICAL HISTORY: Dementia, Stroke, HTN, HLP, atherosclerosis of Coronary artery, others as documented. ANTHROPOMETRICS:  HT: 165.1 cm (5' 5\")       WT: 49.7 kg (109 lb 9.6 oz)  BMI: Body mass index is 18.24 kg/m².          BMI Classific and RN documentation reviewed. - Ton score:  8    NUTRITION PRESCRIPTION:  Diet: NPO  Oral Nutrition Supplements (ONS) none at this time.    Estimated Nutrition needs:   Calories: 1750 calories/day (35 calories per kg Actual body wt (ABW))  Protein: 75-9

## 2018-08-17 ENCOUNTER — ANESTHESIA (OUTPATIENT)
Dept: ENDOSCOPY | Facility: HOSPITAL | Age: 83
End: 2018-08-17

## 2018-08-17 ENCOUNTER — ANESTHESIA EVENT (OUTPATIENT)
Dept: ENDOSCOPY | Facility: HOSPITAL | Age: 83
End: 2018-08-17

## 2018-08-17 LAB
ALBUMIN SERPL BCP-MCNC: 3 G/DL (ref 3.5–4.8)
ANION GAP SERPL CALC-SCNC: 11 MMOL/L (ref 0–18)
BASOPHILS # BLD: 0 K/UL (ref 0–0.2)
BASOPHILS NFR BLD: 0 %
BUN SERPL-MCNC: 35 MG/DL (ref 8–20)
BUN/CREAT SERPL: 15 (ref 10–20)
CALCIUM SERPL-MCNC: 9.1 MG/DL (ref 8.5–10.5)
CHLORIDE SERPL-SCNC: 124 MMOL/L (ref 95–110)
CO2 SERPL-SCNC: 17 MMOL/L (ref 22–32)
CREAT SERPL-MCNC: 2.33 MG/DL (ref 0.5–1.5)
EOSINOPHIL # BLD: 0.2 K/UL (ref 0–0.7)
EOSINOPHIL NFR BLD: 3 %
ERYTHROCYTE [DISTWIDTH] IN BLOOD BY AUTOMATED COUNT: 14.8 % (ref 11–15)
GLUCOSE SERPL-MCNC: 111 MG/DL (ref 70–99)
HCT VFR BLD AUTO: 34.4 % (ref 41–52)
HGB BLD-MCNC: 11.7 G/DL (ref 13.5–17.5)
LYMPHOCYTES # BLD: 1 K/UL (ref 1–4)
LYMPHOCYTES NFR BLD: 14 %
MCH RBC QN AUTO: 34.2 PG (ref 27–32)
MCHC RBC AUTO-ENTMCNC: 34.1 G/DL (ref 32–37)
MCV RBC AUTO: 100.4 FL (ref 80–100)
MONOCYTES # BLD: 0.4 K/UL (ref 0–1)
MONOCYTES NFR BLD: 5 %
NEUTROPHILS # BLD AUTO: 5.5 K/UL (ref 1.8–7.7)
NEUTROPHILS NFR BLD: 78 %
OSMOLALITY UR CALC.SUM OF ELEC: 323 MOSM/KG (ref 275–295)
PHOSPHATE SERPL-MCNC: 3.3 MG/DL (ref 2.4–4.7)
PLATELET # BLD AUTO: 191 K/UL (ref 140–400)
PMV BLD AUTO: 10.4 FL (ref 7.4–10.3)
POTASSIUM SERPL-SCNC: 3.4 MMOL/L (ref 3.3–5.1)
RBC # BLD AUTO: 3.43 M/UL (ref 4.5–5.9)
SODIUM SERPL-SCNC: 152 MMOL/L (ref 136–144)
WBC # BLD AUTO: 7 K/UL (ref 4–11)

## 2018-08-17 PROCEDURE — 99233 SBSQ HOSP IP/OBS HIGH 50: CPT | Performed by: NURSE PRACTITIONER

## 2018-08-17 RX ORDER — ONDANSETRON 2 MG/ML
4 INJECTION INTRAMUSCULAR; INTRAVENOUS ONCE AS NEEDED
Status: DISCONTINUED | OUTPATIENT
Start: 2018-08-17 | End: 2018-08-17 | Stop reason: HOSPADM

## 2018-08-17 RX ORDER — MORPHINE SULFATE 4 MG/ML
4 INJECTION, SOLUTION INTRAMUSCULAR; INTRAVENOUS EVERY 10 MIN PRN
Status: DISCONTINUED | OUTPATIENT
Start: 2018-08-17 | End: 2018-08-17 | Stop reason: HOSPADM

## 2018-08-17 RX ORDER — SODIUM CHLORIDE, SODIUM LACTATE, POTASSIUM CHLORIDE, CALCIUM CHLORIDE 600; 310; 30; 20 MG/100ML; MG/100ML; MG/100ML; MG/100ML
INJECTION, SOLUTION INTRAVENOUS CONTINUOUS
Status: DISCONTINUED | OUTPATIENT
Start: 2018-08-17 | End: 2018-08-18

## 2018-08-17 RX ORDER — NALOXONE HYDROCHLORIDE 0.4 MG/ML
80 INJECTION, SOLUTION INTRAMUSCULAR; INTRAVENOUS; SUBCUTANEOUS AS NEEDED
Status: DISCONTINUED | OUTPATIENT
Start: 2018-08-17 | End: 2018-08-17 | Stop reason: HOSPADM

## 2018-08-17 RX ORDER — MORPHINE SULFATE 10 MG/ML
6 INJECTION, SOLUTION INTRAMUSCULAR; INTRAVENOUS EVERY 10 MIN PRN
Status: DISCONTINUED | OUTPATIENT
Start: 2018-08-17 | End: 2018-08-17 | Stop reason: HOSPADM

## 2018-08-17 RX ORDER — HYDROCODONE BITARTRATE AND ACETAMINOPHEN 5; 325 MG/1; MG/1
2 TABLET ORAL AS NEEDED
Status: DISCONTINUED | OUTPATIENT
Start: 2018-08-17 | End: 2018-08-17 | Stop reason: HOSPADM

## 2018-08-17 RX ORDER — MORPHINE SULFATE 4 MG/ML
2 INJECTION, SOLUTION INTRAMUSCULAR; INTRAVENOUS EVERY 10 MIN PRN
Status: DISCONTINUED | OUTPATIENT
Start: 2018-08-17 | End: 2018-08-17 | Stop reason: HOSPADM

## 2018-08-17 RX ORDER — HYDROCODONE BITARTRATE AND ACETAMINOPHEN 5; 325 MG/1; MG/1
1 TABLET ORAL AS NEEDED
Status: DISCONTINUED | OUTPATIENT
Start: 2018-08-17 | End: 2018-08-17 | Stop reason: HOSPADM

## 2018-08-17 NOTE — PROGRESS NOTES
[facility]    Progress Note    Kanika Rivas Patient Status:  Inpatient    1935 MRN [id number]   Location [facility] 5SW/SE Attending Jeevan Peres MD   Hosp Day # 3 PCP SAURABH VALLADARES MD       Subjective:   Christine Espitia 128*  124*   CO2  13*  18*  17*          Xr Chest Ap Portable  (cpt=71045)    Result Date: 8/15/2018  CONCLUSION: Normal examination.       Dictated by (CST): Gillian Tinoco MD on 8/15/2018 at 9:05     Approved by (CST): Gillian Tinoco MD

## 2018-08-17 NOTE — ANESTHESIA PREPROCEDURE EVALUATION
Anesthesia PreOp Note    HPI:     Galileo Tarango is a 82year old male who presents for preoperative consultation requested by: So Perales, Ortega Basilio MD    Date of Surgery: 8/13/2018 - 8/17/2018    Procedure(s):  PERCUTANEOUS ENDOSCOPIC GASTROSTOMY (two) times daily. Disp:  Rfl:  8/13/2018 at Unknown time   AmLODIPine Besylate 5 MG Oral Tab Take 5 mg by mouth daily. Disp:  Rfl:  8/13/2018 at Unknown time   tamsulosin HCl 0.4 MG Oral Cap Take 0.8 mg by mouth daily.    Disp:  Rfl:  8/13/2018 at Unknown Cholecalciferol (VITAMIN D) 1000 units tab 2,000 Units 2,000 Units Oral Daily Lilia Bone MD     Piperacillin Sod-Tazobactam So (ZOSYN) 3.375 g in dextrose 5 % 100 mL ADD-vantage 3.375 g Intravenous Q12H Elvis Herrmann MD Last Rate: 25 mL/hr at 08/17/1 (37.3 °C) 97.9 °F (36.6 °C) 98.1 °F (36.7 °C) 98.2 °F (36.8 °C)   TempSrc: Axillary Axillary Axillary Axillary   SpO2: 97% 98% 99% 99%   Weight:       Height:            Anesthesia ROS/Med Hx and Physical Exam      Airway   Mallampati: II  Neck ROM: full

## 2018-08-17 NOTE — CONSULTS
[facility]  Palliative Care Follow Up    Iqra Vieyra Patient Status:  Inpatient    1935 MRN [id number]   Location [facility] 5SW/SE Attending Chrissy Oliveira MD   Hosp Day # 3 PCP SAURABH Sorensen MD     Date of Consult: 0 Florence with a copy placed into the chart. Another copy will be tubed down to Registration for scanning into [facility]. I also recommended that community palliative care follow Bay upon discharge and Florence seemed open to this idea.      Review of Systems: (PROCARDIA XL) 24 hr tab 90 mg, 90 mg, Oral, Daily  •  Alfuzosin HCl ER (UROXATRAL) 24 hr tab 10 mg, 10 mg, Oral, Daily with breakfast  •  Cholecalciferol (VITAMIN D) 1000 units tab 2,000 Units, 2,000 Units, Oral, Daily  •  Piperacillin Sod-Tazobactam So ( disorder       Hyperlipidemia       Nosocomial pneumonia       Acute renal failure superimposed on chronic kidney disease, unspecified CKD stage, unspecified [facility]       Dysphagia  -Family wants to pursue feeding tube placement  -\

## 2018-08-17 NOTE — PLAN OF CARE
Problem: Patient/Family Goals  Goal: Patient/Family Long Term Goal  Patient's Long Term Goal: per sister \" want to figure out what is causing the pneumonia\"    Interventions:  - Video swallow   - maintain NPO  - See additional Care Plan goals for specifi neutropenic period  INTERVENTIONS  - Monitor WBC  - Administer growth factors as ordered  - Implement neutropenic guidelines   Outcome: Progressing      Problem: SAFETY ADULT - FALL  Goal: Free from fall injury  INTERVENTIONS:  - Assess pt frequently for p oxygen saturation or ABGs  - Provide Smoking Cessation handout, if applicable  - Encourage broncho-pulmonary hygiene including cough, deep breathe, Incentive Spirometry  - Assess the need for suctioning and perform as needed  - Assess and instruct to repor

## 2018-08-17 NOTE — CM/SW NOTE
MDO received for referral to palliative care, preference for [facility]. Referral sent via [facility]acon. [facility] once stable. Updated clinicals sent to them also. Pt is on room air, no isolation.     [facility] at [address] [place] [address]

## 2018-08-17 NOTE — CONSULTS
[facility]    PATIENT'S NAME: ANGEL JOSUE   ATTENDING PHYSICIAN: Elvis Herrera MD   CONSULTING PHYSICIAN: Chung Castillo.  Shelly Busatmante MD   PATIENT ACCOUNT#:   [id number]    LOCATION:  [facility] POOL ROOM 14 [facility] 10  MEDICAL RECORD #:   [id number]

## 2018-08-17 NOTE — PLAN OF CARE
Pt arrived to endo from floor, combative, trying to bite rn, unable to get a set of vitals or hook pt up to monitor, will monitor pt and attemt again once calms down

## 2018-08-17 NOTE — PROGRESS NOTES
Several discussions  With family. They prefer hospice to   g tube placement. will send patient to floor

## 2018-08-17 NOTE — PROGRESS NOTES
[facility]    Progress Note    Renee Patiño Patient Status:  Inpatient    1935 MRN [id number]   Location [facility] 5SW/SE Attending Stephen Alvarado MD   Hosp Day # 3 PCP SAURABH VALLADARES MD       SUBJECTIVE:  Patient:. at 08/17/18 0625  Last data filed at 08/16/18 1848   Gross per 24 hour   Intake                0 ml   Output                0 ml   Net                0 ml       Wt Readings from Last 3 Encounters:  08/14/18 : 109 lb 9.6 oz (49.7 kg)  07/26/18 : 115 lb 1.6

## 2018-08-17 NOTE — SLP NOTE
SPEECH DAILY NOTE - INPATIENT    ASSESSMENT & PLAN   ASSESSMENT    SLP called to Pt's room by RN to provide education to Pt's sister regarding Pt's swallowing status.  Education provided regarding results of the the VFSS on 8/14/18 and the BSE on 8/15/18 an

## 2018-08-18 VITALS
OXYGEN SATURATION: 97 % | RESPIRATION RATE: 18 BRPM | BODY MASS INDEX: 18.27 KG/M2 | HEART RATE: 74 BPM | WEIGHT: 109.63 LBS | DIASTOLIC BLOOD PRESSURE: 70 MMHG | SYSTOLIC BLOOD PRESSURE: 136 MMHG | HEIGHT: 65 IN | TEMPERATURE: 99 F

## 2018-08-18 LAB
ALBUMIN SERPL BCP-MCNC: 2.9 G/DL (ref 3.5–4.8)
ANION GAP SERPL CALC-SCNC: 9 MMOL/L (ref 0–18)
BASOPHILS # BLD: 0 K/UL (ref 0–0.2)
BASOPHILS NFR BLD: 0 %
BUN SERPL-MCNC: 29 MG/DL (ref 8–20)
BUN/CREAT SERPL: 13.5 (ref 10–20)
CALCIUM SERPL-MCNC: 9 MG/DL (ref 8.5–10.5)
CHLORIDE SERPL-SCNC: 121 MMOL/L (ref 95–110)
CO2 SERPL-SCNC: 16 MMOL/L (ref 22–32)
CREAT SERPL-MCNC: 2.15 MG/DL (ref 0.5–1.5)
EOSINOPHIL # BLD: 0.2 K/UL (ref 0–0.7)
EOSINOPHIL NFR BLD: 3 %
ERYTHROCYTE [DISTWIDTH] IN BLOOD BY AUTOMATED COUNT: 15.1 % (ref 11–15)
GLUCOSE SERPL-MCNC: 107 MG/DL (ref 70–99)
HCT VFR BLD AUTO: 34.5 % (ref 41–52)
HGB BLD-MCNC: 11.6 G/DL (ref 13.5–17.5)
LYMPHOCYTES # BLD: 0.7 K/UL (ref 1–4)
LYMPHOCYTES NFR BLD: 11 %
MCH RBC QN AUTO: 34.4 PG (ref 27–32)
MCHC RBC AUTO-ENTMCNC: 33.8 G/DL (ref 32–37)
MCV RBC AUTO: 102 FL (ref 80–100)
MONOCYTES # BLD: 0.4 K/UL (ref 0–1)
MONOCYTES NFR BLD: 5 %
NEUTROPHILS # BLD AUTO: 5.5 K/UL (ref 1.8–7.7)
NEUTROPHILS NFR BLD: 81 %
OSMOLALITY UR CALC.SUM OF ELEC: 308 MOSM/KG (ref 275–295)
PHOSPHATE SERPL-MCNC: 2.8 MG/DL (ref 2.4–4.7)
PLATELET # BLD AUTO: 170 K/UL (ref 140–400)
PMV BLD AUTO: 10.3 FL (ref 7.4–10.3)
POTASSIUM SERPL-SCNC: 3.6 MMOL/L (ref 3.3–5.1)
RBC # BLD AUTO: 3.38 M/UL (ref 4.5–5.9)
SODIUM SERPL-SCNC: 146 MMOL/L (ref 136–144)
WBC # BLD AUTO: 6.8 K/UL (ref 4–11)

## 2018-08-18 NOTE — CM/SW NOTE
Addend 1028p:     Marion Mohamud from [facility] informed SW that pt's family will come for hospice meeting at [address] [facility] today 8/18. GRETCHEN updated RN. GRETCHEN will follow up w/ hospice after meeting. [facility].

## 2018-08-18 NOTE — CM/SW NOTE
Hospice sw called sister Florence to schedule appt to meet and discuss hospice services. Sister stated that she would be at hospital between 12-1.   Sw arranged to meet at 1pm.

## 2018-08-18 NOTE — CM/SW NOTE
ADDENDUM 4:04PM    GRETCHEN informed that pt is medically stable for discharge today at 4:30pm. GRETCHEN spoke with [facility] from [facility] who confirmed that they are able to accept pt at that time into room 103. GRETCHEN informed [facility] of 4:30pm d/c time.  Per hos

## 2018-08-18 NOTE — HOSPICE RN NOTE
Hospice RN and MSW met with patient's family, including patient's sister/ CHALO Henriquez. Consents for hospice were signed. Patient to be discharged from hospital at 4:30pm back to [address].   Patient will be admitted to hospice when he re

## 2018-08-22 NOTE — PAYOR COMM NOTE
PLEASE FAX IF THIS WAS APPROVED OR NOT. THANKS!     DISCHARGE REVIEW    Payor: [employer] MEDICARE ADV PPO  Subscriber #:  [id number]  Authorization Number: [id number]    Admit date: 8/14/18  Admit time:  1328  Discharge Date: 8/18/2018  6:04 PM     Admitting P not to have it done and hospice was arranged. The patient went to a nearby skilled nursing facility in hospice care to be followed by the hospice physician for all future medical needs. Dictated By Dilip P. Davonna Schirmer, MD  d: 08/22/2018 07:48:02    SW inf

## 2018-08-22 NOTE — DISCHARGE SUMMARY
[facility]    PATIENT'S NAME: ANGEL JOSUE   ATTENDING PHYSICIAN: Elvis Mejía MD   PATIENT ACCOUNT#:   [id number]    LOCATION:  [address] [place] [address] RECORD #:   [id number]       YOB: 1935  ADMISSION DATE:       08/13/2018

## (undated) DEVICE — Device: Brand: DEFENDO AIR/WATER/SUCTION AND BIOPSY VALVE

## (undated) DEVICE — ENDOSCOPY PACK UPPER: Brand: MEDLINE INDUSTRIES, INC.

## (undated) NOTE — ED AVS SNAPSHOT
[facility] Emergency Department  [address] [address]  Phone:  [phone]  Fax:  [address] [place] [address]   MRN: [id number]    Department:  [facility] Emergency Department   Date of Visit:  7/16/2017 visiting [email or web address]    IF THERE IS ANY CHANGE OR WORSENING OF YOUR CONDITION, CALL YOUR PRIMARY CARE PHYSICIAN AT ONCE OR RETURN IMMEDIATELY TO THE EMERGENCY DEPARTMENT.     If you have been prescribed any medication(s), please fill your prescription

## (undated) NOTE — IP AVS SNAPSHOT
Patient Demographics     Address  [address]  [address] Phone  [phone] [facility]) E-mail Address  [email or web address]      Emergency Contact(s)     Name Relation Home Work [place] Sister [phone]      Allergies as Take 1 tablet by mouth daily. Stop taking on:  8/23/2018   SAURABH VALLADARES MD         NIFEdipine ER Osmotic Release 90 MG Tb24  Commonly known as:  PROCARDIA XL  Next dose due:  8/19/18 in the morning      Take 90 mg by mouth daily.           tamsulosin H SpO2  96 % Filed at 08/18/2018 1000      Patient's Most Recent Weight    Flowsheet Row Most Recent Value   Patient Weight  49.7 kg (109 lb 9.6 oz)         Lab Results Last 24 Hours      CBC WITH DIFFERENTIAL WITH PLATELET [[id number]]  Resulted: 08/18/18 07 Estimated GFR units: mL/min/1.73 square meters   eGFR calculated by the CKD-EPI equation. Testing Performed By     [address] Name Director Address Valid Date Range    [address] Lab [facility] Ector Marquez. Marga Roldan M.D. [facility]  [facility] hypertensive kidney disease, CVAs in the past, recently residing in a nearby skilled nursing facility after he fell down about a month ago, leading to small intracranial hemorrhage, has slowed down in eating, and for the last day or 2 was not eating much o 5.   Poor patient cooperation in eating and physical therapy. PLAN:  Discussed with the patient's sister at the bedside who wants everything feasible, IV fluids, Renal opinion.   Chest x-ray viewed, which shows atelectasis with small possibility of aspi with the family whether we want to proceed with a G-tube or not. I told them there is a good chance he will try to pull the darn thing out and we would have to put a binder on there for him. Risks and benefits were explained in detail.   The family is com open mouth. Described incidence where Pt became combative when SLP attempted to clean spillage of bolus from oral cavity from Pt's face. Discussed Pt's diagnosis of dementia and impact on Pt's overall swallow ability. Sister with good understanding.  At [facility]

## (undated) NOTE — IP AVS SNAPSHOT
Patient Demographics     Address  [address] [address] Phone  [phone] [facility]) E-mail Address  [email or web address]      Emergency Contact(s)     Name Relation Home Work Chery raymond Sister [phone]      Allergies as Next dose due:  7/27/18 morning      Take 90 mg by mouth daily. tamsulosin HCl 0.4 MG Caps  Commonly known as:  FLOMAX  Next dose due:  7/27/18 morning      Take 0.4 mg by mouth daily.           Vitamin D3 2000 units Caps  Commonly known as:  VITAM Chloride 113 95 - 110 mmol/L [facility]   CO2 26 22 - 32 mmol/L — [facility]   BUN 40 8 - 20 mg/dL [facility]   Creatinine 2.39 0.50 - 1.50 mg/dL [facility]   Calcium, Total 9.2 8.5 - 10.5 mg/dL [employer]   BUN/CREA Ratio 16.7 10.0 - 20. the ER with history of fall at home.   In the ER, his CT scan was abnormal, showing parenchymal hemorrhage, and the patient being on aspirin/Plavix, requiring platelet transfusion, now admitted in hospital for further observation, treatment, and neurologica Dictated By Elvis Arteaga MD  d: 07/21/2018 08:42:46  t: 07/21/2018 11:40:13  [facility] [id number]  [facility]/  [DP.1]  Electronically signed by Alex Pappas MD on 7/23/2018  9:01 AM   Attribution [employer]    DP. 1 - Alex Pappas MD on 7/21/2018 12:02 P surgery. PHYSICAL EXAMINATION:  He was disoriented to place and time. He knows his name and knows his sister's name. He had no idea where he was. His sister indicated he is profoundly demented.   He is somewhat contracted, but is able to move all 4 Filed:  7/25/2018  5:55 PM Date of Service:  7/25/2018  5:42 PM Status:  Signed    :  Nell Little (Physical Therapist)       PHYSICAL THERAPY TREATMENT NOTE - INPATIENT     Room Number: 325/325-A       Presenting Problem: admitted s/p falling conservation;Patient education; Family education;Gait training;Strengthening;Transfer training    SUBJECTIVE  No comment from patient    OBJECTIVE  Precautions: Bed/chair alarm    WEIGHT BEARING RESTRICTION  Weight Bearing Restriction: None                P Patient Goal Patient's self-stated goal is unable to state. Goal #1 Patient is able to demonstrate supine< - >sit EOB @ level:superv   Goal #1   Current Status NT this session;  Mod A previous   Goal #2 Patient is able to demonstrate transfers sit <-> st momentum. The pt is very distractible and aphasic which are limiting factors with rehab progression. The pt did tolerate amb 150' with min to mod A.  Pt is on track for ABBI upon D/C to regain his maximal functional mobility and safety to allow a return to h Assistive Device: None (gait belt and hand hold assist )  Pattern: Shuffle (narrow base of support and decreased step length)     Comment : spouse present    Additional information: Pt is distractible and intermittently unpredictable and may become confuse Filed:  7/23/2018 11:13 AM Date of Service:  7/23/2018 11:05 AM Status:  Signed    :  Aden Hernandez SLP (SPEECH-LANGUAGE PATHOLOGIST)       SPEECH DAILY NOTE - INPATIENT    ASSESSMENT & PLAN   ASSESSMENT  Pt seen for swallowing therapy to [facility] liquid wash. Pharyngeal phase of swallow appears about 2 secondsdelayed with mild reduction in hyolaryngeal elevation in ROM/strength to palpation.   No overt clinical signs of aspiration observed with any consistency: no reflexive cough, no throat clearin

## (undated) NOTE — ED AVS SNAPSHOT
Rosaura Ramirez   MRN: [id number]    Department:  [facility] Emergency Department   Date of Visit:  7/31/2017           Disclosure     Insurance plans vary and the physician(s) referred by the ER may not be covered by your plan.  Please contact y CARE PHYSICIAN AT ONCE OR RETURN IMMEDIATELY TO THE EMERGENCY DEPARTMENT. If you have been prescribed any medication(s), please fill your prescription right away and begin taking the medication(s) as directed.   If you believe that any of the medications

## (undated) NOTE — IP AVS SNAPSHOT
[facility]            (For Outpatient Use Only) Initial Admit Date: 7/20/2018   Inpt/Obs Admit Date: Inpt: 7/20/18 / Obs: N/A   Discharge Date:    Beverly Mo:  [de-identified]   MRN: [de-identified]   CSN: [id number]        ENCOUNTER  Patient Class Subscriber Name:  Myriam Garcia :    Subscriber ID:  Pt Rel to Subscriber:    Hospital Account Financial Class: Medicare Advantage    2018

## (undated) NOTE — IP AVS SNAPSHOT
[facility]            (For Outpatient Use Only) Initial Admit Date: 8/13/2018   Inpt/Obs Admit Date: Inpt: 8/14/18 / Obs: N/A   Discharge Date:    Kenia Later:  [de-identified]   MRN: [de-identified]   CSN: [id number]        ENCOUNTER  Patient Class Subscriber ID:  Pt Rel to Subscriber:    Hospital Account Financial Class: Medicare Advantage    August 18, 2018

## (undated) NOTE — ED AVS SNAPSHOT
Herminia Hirsch   MRN: [id number]    Department:  [facility] Emergency Department   Date of Visit:  9/1/2017           Disclosure     Insurance plans vary and the physician(s) referred by the ER may not be covered by your plan.  Please contact yo CARE PHYSICIAN AT ONCE OR RETURN IMMEDIATELY TO THE EMERGENCY DEPARTMENT. If you have been prescribed any medication(s), please fill your prescription right away and begin taking the medication(s) as directed.   If you believe that any of the medications

## (undated) NOTE — ED AVS SNAPSHOT
Daryle Gentleman   MRN: [id number]    Department:  [facility] Emergency Department   Date of Visit:  1/29/2018           Disclosure     Insurance plans vary and the physician(s) referred by the ER may not be covered by your plan.  Please contact y within the next three months to obtain basic health screening including reassessment of your blood pressure.     IF THERE IS ANY CHANGE OR WORSENING OF YOUR CONDITION, CALL YOUR PRIMARY CARE PHYSICIAN AT ONCE OR RETURN IMMEDIATELY TO THE EMERGENCY DEPARTMEN